# Patient Record
Sex: FEMALE | Race: WHITE | NOT HISPANIC OR LATINO | Employment: FULL TIME | URBAN - METROPOLITAN AREA
[De-identification: names, ages, dates, MRNs, and addresses within clinical notes are randomized per-mention and may not be internally consistent; named-entity substitution may affect disease eponyms.]

---

## 2017-02-14 DIAGNOSIS — F32.0 MILD SINGLE CURRENT EPISODE OF MAJOR DEPRESSIVE DISORDER (H): Primary | ICD-10-CM

## 2017-02-15 NOTE — TELEPHONE ENCOUNTER
Venlafaxine    Last Written Prescription Date: 11/18/16  Last Fill Quantity: 90, # refills: 0  Last Office Visit with [facility], [facility] or [facility] prescribing provider: 9/21/16        BP Readings from Last 3 Encounters:   09/21/16 107/69   05/06/16 101/64   04/22/15 105/67     Pulse: (for Fetzima)  Creatinine   Date Value Ref Range Status   04/22/2015 0.98 0.52 - 1.04 mg/dL Final   ]    Last PHQ-9 score on record=   PHQ-9 SCORE 11/18/2016   Total Score -   Total Score 2     Yusra Gonzales [facility]

## 2017-02-16 PROBLEM — F32.0 MILD SINGLE CURRENT EPISODE OF MAJOR DEPRESSIVE DISORDER (H): Status: ACTIVE | Noted: 2017-02-16

## 2017-02-16 RX ORDER — VENLAFAXINE HYDROCHLORIDE 75 MG/1
CAPSULE, EXTENDED RELEASE ORAL
Qty: 90 CAPSULE | Refills: 0 | Status: SHIPPED | OUTPATIENT
Start: 2017-02-16 | End: 2017-06-14

## 2017-02-16 NOTE — TELEPHONE ENCOUNTER
Routing refill request to provider for review/approval because:  Labs not current:  CrRadha Navarro RN  [facility]  [phone]

## 2017-05-12 ENCOUNTER — HOSPITAL ENCOUNTER (OUTPATIENT)
Dept: MAMMOGRAPHY | Facility: CLINIC | Age: 42
Discharge: HOME | End: 2017-05-12
Attending: NURSE PRACTITIONER | Admitting: NURSE PRACTITIONER
Payer: COMMERCIAL

## 2017-05-12 DIAGNOSIS — Z12.31 ENCOUNTER FOR SCREENING MAMMOGRAM FOR BREAST CANCER: ICD-10-CM

## 2017-05-12 PROCEDURE — G0202 SCR MAMMO BI INCL CAD: HCPCS

## 2017-06-14 DIAGNOSIS — F32.0 MILD SINGLE CURRENT EPISODE OF MAJOR DEPRESSIVE DISORDER (H): ICD-10-CM

## 2017-06-14 NOTE — LETTER
[facility]   [address] [place] [address]   [phone]      June 27, 2017    Nicole Lee Meester  [address]  [place] [address]            Dear Ms. Meester    Your physician requires a physical every 12 months in order to monitor your maintenance medication(s).  Your last physical was on 5/6/16.  We have faxed to the pharmacy a 3 month refill of your medication(s) until you can be seen.  Please call the clinic at [phone] to schedule an appointment..        Sincerely,    [facility]

## 2017-06-14 NOTE — TELEPHONE ENCOUNTER
Venlafaxine    Last Written Prescription Date: 2/16/17  Last Fill Quantity: 90, # refills: 0  Last Office Visit with [facility], [facility] or [facility] prescribing provider: 9/21/16        BP Readings from Last 3 Encounters:   09/21/16 107/69   05/06/16 101/64   04/22/15 105/67     Pulse: (for Fetzima)  Creatinine   Date Value Ref Range Status   04/22/2015 0.98 0.52 - 1.04 mg/dL Final   ]    Last PHQ-9 score on record=   PHQ-9 SCORE 11/18/2016   Total Score -   Total Score 2     Yusra Gonzales [facility]

## 2017-06-14 NOTE — LETTER
[facility]   [address] [place] [address]   [phone]      June 22, 2017    Nicole Lee Meester  [address] [address]            Dear Ms. Meester    Your physician requires an office visit every 12 months in order to monitor your maintenance medication(s).  Your last office visit was on 5/6/16.  We have faxed to the pharmacy a 3 month refill of your medication(s) until you can be seen by your provider.  Please call the clinic at [phone] to schedule an appointment..        Sincerely,    [facility]

## 2017-06-14 NOTE — TELEPHONE ENCOUNTER
Patient due for PHQ-9  Non-detailed message left to return our call.    Latasha Hansen RN   [employer]

## 2017-06-15 RX ORDER — VENLAFAXINE HYDROCHLORIDE 75 MG/1
CAPSULE, EXTENDED RELEASE ORAL
Qty: 90 CAPSULE | Refills: 0 | Status: SHIPPED | OUTPATIENT
Start: 2017-06-15 | End: 2020-07-05

## 2017-06-15 NOTE — TELEPHONE ENCOUNTER
PHQ-9 score:    PHQ-9 SCORE 6/15/2017   Total Score -   Total Score 2           Zita NavarroRN  [facility]  [phone]

## 2017-06-15 NOTE — TELEPHONE ENCOUNTER
Refill ordered. Patient needs to come in for annual exam and establishing her care.    Bailey Hernández MD  [facility], [place]

## 2017-06-16 ASSESSMENT — PATIENT HEALTH QUESTIONNAIRE - PHQ9: SUM OF ALL RESPONSES TO PHQ QUESTIONS 1-9: 2

## 2018-05-18 ENCOUNTER — HOSPITAL ENCOUNTER (OUTPATIENT)
Dept: MAMMOGRAPHY | Facility: CLINIC | Age: 43
Discharge: HOME | End: 2018-05-18
Attending: NURSE PRACTITIONER | Admitting: NURSE PRACTITIONER
Payer: COMMERCIAL

## 2018-05-18 DIAGNOSIS — Z12.39 BREAST CANCER SCREENING: ICD-10-CM

## 2018-05-18 PROCEDURE — 77067 SCR MAMMO BI INCL CAD: CPT

## 2018-06-24 ENCOUNTER — HEALTH MAINTENANCE LETTER (OUTPATIENT)
Age: 43
End: 2018-06-24

## 2018-11-01 ENCOUNTER — OFFICE VISIT (OUTPATIENT)
Dept: FAMILY MEDICINE | Facility: CLINIC | Age: 43
End: 2018-11-01
Payer: COMMERCIAL

## 2018-11-01 VITALS
HEIGHT: 67 IN | SYSTOLIC BLOOD PRESSURE: 117 MMHG | DIASTOLIC BLOOD PRESSURE: 74 MMHG | WEIGHT: 200 LBS | TEMPERATURE: 97.1 F | HEART RATE: 78 BPM | BODY MASS INDEX: 31.39 KG/M2

## 2018-11-01 DIAGNOSIS — Z00.00 ENCOUNTER FOR ROUTINE ADULT HEALTH EXAMINATION WITHOUT ABNORMAL FINDINGS: Primary | ICD-10-CM

## 2018-11-01 DIAGNOSIS — F32.0 MILD SINGLE CURRENT EPISODE OF MAJOR DEPRESSIVE DISORDER (H): ICD-10-CM

## 2018-11-01 DIAGNOSIS — Z23 NEED FOR PROPHYLACTIC VACCINATION WITH TETANUS-DIPHTHERIA (TD): ICD-10-CM

## 2018-11-01 DIAGNOSIS — R53.83 FATIGUE, UNSPECIFIED TYPE: ICD-10-CM

## 2018-11-01 DIAGNOSIS — Z13.1 SCREENING FOR DIABETES MELLITUS: ICD-10-CM

## 2018-11-01 DIAGNOSIS — Z13.220 SCREENING FOR LIPID DISORDERS: ICD-10-CM

## 2018-11-01 DIAGNOSIS — N94.6 DYSMENORRHEA: ICD-10-CM

## 2018-11-01 DIAGNOSIS — Z12.4 SCREENING FOR MALIGNANT NEOPLASM OF CERVIX: ICD-10-CM

## 2018-11-01 DIAGNOSIS — B35.4 TINEA CORPORIS: ICD-10-CM

## 2018-11-01 DIAGNOSIS — L29.9 PRURITIC DISORDER: ICD-10-CM

## 2018-11-01 DIAGNOSIS — N63.0 LUMP OR MASS IN BREAST: ICD-10-CM

## 2018-11-01 LAB
CHOLEST SERPL-MCNC: 151 MG/DL
GLUCOSE SERPL-MCNC: 93 MG/DL (ref 70–99)
HDLC SERPL-MCNC: 77 MG/DL
HGB BLD-MCNC: 12.8 G/DL (ref 11.7–15.7)
LDLC SERPL CALC-MCNC: 60 MG/DL
NONHDLC SERPL-MCNC: 74 MG/DL
TRIGL SERPL-MCNC: 71 MG/DL
TSH SERPL DL<=0.005 MIU/L-ACNC: 1.82 MU/L (ref 0.4–4)

## 2018-11-01 PROCEDURE — 90471 IMMUNIZATION ADMIN: CPT | Performed by: NURSE PRACTITIONER

## 2018-11-01 PROCEDURE — 80061 LIPID PANEL: CPT | Performed by: NURSE PRACTITIONER

## 2018-11-01 PROCEDURE — 99396 PREV VISIT EST AGE 40-64: CPT | Mod: 25 | Performed by: NURSE PRACTITIONER

## 2018-11-01 PROCEDURE — 87624 HPV HI-RISK TYP POOLED RSLT: CPT | Performed by: NURSE PRACTITIONER

## 2018-11-01 PROCEDURE — 90714 TD VACC NO PRESV 7 YRS+ IM: CPT | Performed by: NURSE PRACTITIONER

## 2018-11-01 PROCEDURE — 85018 HEMOGLOBIN: CPT | Performed by: NURSE PRACTITIONER

## 2018-11-01 PROCEDURE — 99214 OFFICE O/P EST MOD 30 MIN: CPT | Mod: 25 | Performed by: NURSE PRACTITIONER

## 2018-11-01 PROCEDURE — 84443 ASSAY THYROID STIM HORMONE: CPT | Performed by: NURSE PRACTITIONER

## 2018-11-01 PROCEDURE — 36415 COLL VENOUS BLD VENIPUNCTURE: CPT | Performed by: NURSE PRACTITIONER

## 2018-11-01 PROCEDURE — 82947 ASSAY GLUCOSE BLOOD QUANT: CPT | Performed by: NURSE PRACTITIONER

## 2018-11-01 PROCEDURE — G0145 SCR C/V CYTO,THINLAYER,RESCR: HCPCS | Performed by: NURSE PRACTITIONER

## 2018-11-01 RX ORDER — TRIAMCINOLONE ACETONIDE 1 MG/G
CREAM TOPICAL
Qty: 15 G | Refills: 0 | Status: SHIPPED | OUTPATIENT
Start: 2018-11-01 | End: 2019-08-01

## 2018-11-01 RX ORDER — KETOCONAZOLE 20 MG/G
CREAM TOPICAL DAILY
Qty: 15 G | Refills: 3 | Status: SHIPPED | OUTPATIENT
Start: 2018-11-01 | End: 2018-11-29

## 2018-11-01 NOTE — PROGRESS NOTES
SUBJECTIVE:   CC: Nicole Lee Meester is an 43 year old woman who presents for preventive health visit.     Healthy Habits:    Do you get at least three servings of calcium containing foods daily (dairy, green leafy vegetables, etc.)? yes    Amount of exercise or daily activities, outside of work: twice a week/ 40 min    Problems taking medications regularly No    Medication side effects: No    Have you had an eye exam in the past two years? yes    Do you see a dentist twice per year? yes    Do you have sleep apnea, excessive snoring or daytime drowsiness?no    HPI: Other issues:    Fatigue and sleepiness (despite 7 hours a night sleep). She denies change in routine, diet, or other factors to explain this. Denies snoring, sleep apnea, or insomnia. She does endorse cold intolerance and heavier than usual menses.     Weight gain (20 lb over 3 years). She endorses a slow, progressive weight gain despite no change to diet or activity level. She denies ankle swelling, shortness of breath while lying flat.    Heavy menses. Over the past 4-5 months, she has noted a greater than normal amount of bleeding associated with menses. Associated symptoms are unchanged in intensity or character. Duration of bleeding and menstrual cycle length are also unchanged.     Breast pain (left) with associated lump - both grandmothers (in their 60s). She had a screening mammogram in May which was read as normal with heterogeneous density. Recently, she has noted the development of a painful lump just below her nipple on her left breast. She denies discharge, redness, fever, chills, night sweats, and unintended weight loss.       Today's PHQ-2 Score:   PHQ-2 ( 1999 [employer]) 11/1/2018 9/21/2016   Q1: Little interest or pleasure in doing things 0 0   Q2: Feeling down, depressed or hopeless 0 0   PHQ-2 Score 0 0   Q1: Little interest or pleasure in doing things - -   Q2: Feeling down, depressed or hopeless - -   PHQ-2 Score - -     Abuse:  Current or Past(Physical, Sexual or Emotional)- No  Do you feel safe in your environment - Yes    Social History   Substance Use Topics     Smoking status: Never Smoker     Smokeless tobacco: Never Used     Alcohol use 0.0 oz/week     0 Standard drinks or equivalent per week      Comment: 1-2 drinks per week     If you drink alcohol do you typically have >3 drinks per day or >7 drinks per week? No                     Reviewed orders with patient.  Reviewed health maintenance and updated orders accordingly - Yes    Patient under age 50, mutual decision reflected in health maintenance.       Pertinent mammograms are reviewed under the imaging tab.  History of abnormal Pap smear: NO - age 30-65 PAP every 5 years with negative HPV co-testing recommended    PAP / HPV Latest Ref Rng & Units 4/22/2015 8/10/2011 10/11/2010   PAP - NIL NIL NIL   HPV 16 DNA NEG Negative - -   HPV 18 DNA NEG Negative - -   OTHER HR HPV NEG Negative - -     Reviewed and updated as needed this visit by clinical staff  Tobacco  Allergies  Meds  Med Hx  Surg Hx  Fam Hx  Soc Hx        Reviewed and updated as needed this visit by Provider  Tobacco  Med Hx  Surg Hx  Fam Hx  Soc Hx         ROS:  CONSTITUTIONAL: NEGATIVE for fever, chills. POSITIVE for slow, progressive weight gain, cold intolerance, and fatigue.  INTEGUMENTARU/SKIN: POSITIVE for itch (lower legs) and discrete pink lesions / rashes (lower legs)  EYES: NEGATIVE for vision changes or irritation  ENT: NEGATIVE for ear, mouth and throat problems  RESP: NEGATIVE for significant cough or SOB  BREAST: NEGATIVE for masses, tenderness or discharge  BREAST: POSITIVE for lump (left areola inferior to nipple) and pain (associated with lump)  CV: NEGATIVE for chest pain, palpitations or peripheral edema  GI: NEGATIVE for nausea, abdominal pain, heartburn, or change in bowel habits  : NEGATIVE for unusual urinary or vaginal symptoms. POSITIVE for heavy menses (though of normal frequency  "and duration)  MUSCULOSKELETAL: NEGATIVE for significant arthralgias or myalgia  NEURO: NEGATIVE for weakness, dizziness or paresthesias  PSYCHIATRIC: NEGATIVE for changes in mood or affect    OBJECTIVE:   /74 (BP Location: Right arm, Patient Position: Chair, Cuff Size: Adult Regular)  Pulse 78  Temp 97.1  F (36.2  C) (Tympanic)  Ht 5' 6.93\" (1.7 m)  Wt 200 lb (90.7 kg)  LMP 10/19/2018 (Exact Date)  Breastfeeding? No  BMI 31.39 kg/m2  EXAM:  GENERAL: healthy, alert and no distress  EYES: Eyes grossly normal to inspection, PERRL and conjunctivae and sclerae normal  HENT: ear canals and TM's normal, nose and mouth without ulcers or lesions  NECK: no adenopathy, no asymmetry, masses, or scars and thyroid normal to palpation  RESP: lungs clear to auscultation - no rales, rhonchi or wheezes  BREAST: 2 cm x 1 cm rubbery, mobile lump noted just inferior to left nipple. Tenderness of this region noted. No nipple discharge; no palpable axillary masses or adenopathy  CV: regular rate and rhythm, normal S1 S2, no S3 or S4, no murmur, click or rub, no peripheral edema and peripheral pulses strong  ABDOMEN: soft, nontender, no hepatosplenomegaly, no masses and bowel sounds normal   (female): normal female external genitalia, normal urethral meatus, vaginal mucosa pink, moist, well rugated, and normal cervix/adnexa/uterus without masses or discharge  MS: no gross musculoskeletal defects noted, no edema  SKIN: Left shin: discrete 1 cm x 1 cm pink annular lesion with scale (pruritic). Right shin: discrete 1 cm x 1 cm pink annular lesion with scale (pruritic).  NEURO: Normal strength and tone, mentation intact and speech normal  PSYCH: mentation appears normal, affect normal/bright    Diagnostic Test Results:  Results for orders placed or performed in visit on 11/01/18 (from the past 24 hour(s))   Hemoglobin   Result Value Ref Range    Hemoglobin 12.8 11.7 - 15.7 g/dL       ASSESSMENT/PLAN:   Beth was seen today for " physical and various other issues. Otherwise healthy 43 year old with new-onset fatigue, weight gain, pruritic rash, menstrual bleeding irregularity, and breast mass with pain. PAP with HPV co-test done today, as well as screening labs for lipid disorders and diabetes. Td booster given.     Diagnoses and all orders for this visit:    Encounter for routine adult health examination without abnormal findings    Mild single current episode of major depressive disorder (H)  Comment: DAP done today. Stable. No issues noted.     Screening for malignant neoplasm of cervix  -     Pap imaged thin layer screen with HPV - recommended age 30 - 65 years (select HPV order below)  -     HPV High Risk Types DNA Cervical  -     No suspicious structural anomalies noted on exam.    Need for prophylactic vaccination with tetanus-diphtheria (Td)  -     TD (ADULT, 7+) PRESERVE FREE  -          ADMIN VACCINE, FIRST    Screening for diabetes mellitus  -     Glucose    Screening for lipid disorders  -     Lipid panel reflex to direct LDL Fasting    Fatigue, unspecified type  -     TSH with free T4 reflex  -     Hemoglobin  -     If thyroid and hemoglobin are ok, will pursue other explanations (normal aging phenomenon, malignancy, sleep disorder, depression, etc.)    Lump or mass in breast  -     MA Diagnostic Digital Left; Future  -     US Breast Left Complete 4 Quadrants; Future  -     Will follow closely. Clean mammo in May but breast tissue characterized as heterogeneously dense, so possible that this is a normal benign variant.    Dysmenorrhea  -     Hemoglobin  -      exam unremarkable. Frequency and duration of menses unchanged. Volume of blood is only variable aspect. Will check hemoglobin and explore this further if it continues to occur. No red flags (pain, masses, etc.) at this point to warrant urgent OB-GYN referral or to prompt imaging.     Tinea corporis  -     ketoconazole (NIZORAL) 2 % cream; Apply topically daily   -      "Will treat topically for two weeks, after which she agrees to call if resolution not apparent.     Pruritic disorder  -     triamcinolone (KENALOG) 0.1 % cream; Apply sparingly to affected area three times daily for 14 days.  -      Has stated that this has helped her itch in the past. She does not have known atopic dermatitis, but there is associated itch with dermatophyte lesions noted today.     Other orders  -     DEPRESSION ACTION PLAN (DAP)    COUNSELING:   Reviewed preventive health counseling, as reflected in patient instructions    BP Readings from Last 1 Encounters:   11/01/18 117/74     Estimated body mass index is 31.39 kg/(m^2) as calculated from the following:    Height as of this encounter: 5' 6.93\" (1.7 m).    Weight as of this encounter: 200 lb (90.7 kg).      Weight management plan: Discussed healthy diet and exercise guidelines and patient will follow up in 12 months in clinic to re-evaluate.     reports that she has never smoked. She has never used smokeless tobacco.      Counseling Resources:  ATP IV Guidelines  Pooled Cohorts Equation Calculator  Breast Cancer Risk Calculator  FRAX Risk Assessment  ICSI Preventive Guidelines  Dietary Guidelines for Americans, 2010  USDA's MyPlate  ASA Prophylaxis  Lung CA Screening    Mariano Ayala NP  [facility] JAMIA PRAVANIA  "

## 2018-11-01 NOTE — MR AVS SNAPSHOT
After Visit Summary   11/1/2018    Nicole Lee Meester    MRN: [id number]           Patient Information     Date Of Birth          1975        Visit Information        Provider Department      11/1/2018 8:20 AM Mariano Ayala NP [facility]        Today's Diagnoses     Encounter for routine adult health examination without abnormal findings    -  1    Intractable migraine without aura and with status migrainosus        Mild single current episode of major depressive disorder (H)        Screening for malignant neoplasm of cervix        Need for prophylactic vaccination with tetanus-diphtheria (Td)        Screening for diabetes mellitus        Screening for lipid disorders        Fatigue, unspecified type        Lump or mass in breast        Dysmenorrhea        Tinea corporis        Pruritic disorder          Care Instructions      Preventive Health Recommendations  Female Ages 40 to 49    Yearly exam:     See your health care provider every year in order to  1. Review health changes.   2. Discuss preventive care.    3. Review your medicines if your doctor prescribed any.      Get a Pap test every three years (unless you have an abnormal result and your provider advises testing more often).      If you get Pap tests with HPV test, you only need to test every 5 years, unless you have an abnormal result. You do not need a Pap test if your uterus was removed (hysterectomy) and you have not had cancer.      You should be tested each year for STDs (sexually transmitted diseases), if you're at risk.     Ask your doctor if you should have a mammogram.      Have a colonoscopy (test for colon cancer) if someone in your family has had colon cancer or polyps before age 50.       Have a cholesterol test every 5 years.       Have a diabetes test (fasting glucose) after age 45. If you are at risk for diabetes, you should have this test every 3 years.    Shots: Get a flu shot each year. Get a  tetanus shot every 10 years.     Nutrition:     Eat at least 5 servings of fruits and vegetables each day.    Eat whole-grain bread, whole-wheat pasta and brown rice instead of white grains and rice.    Get adequate Calcium and Vitamin D.      Lifestyle    Exercise at least 150 minutes a week (an average of 30 minutes a day, 5 days a week). This will help you control your weight and prevent disease.    Limit alcohol to one drink per day.    No smoking.     Wear sunscreen to prevent skin cancer.    See your dentist every six months for an exam and cleaning.      1. We will be in touch after labs result and mammo / ultrasound is read.    2. Follow up if no relief of skin itch and lesions.                Follow-ups after your visit        Follow-up notes from your care team     Return in about 2 weeks (around 11/15/2018).      Your next 10 appointments already scheduled     Nov 02, 2018 10:30 AM CDT   MA DIAGNOSTIC DIGITAL LEFT with SHBCMA3   [facility] ([facility])    [address] [place] [address], [address] [place] [address] [id number] [phone]           How do I prepare for my exam? (Food and drink instructions) No Food and Drink Restrictions.  How do I prepare for my exam? (Other instructions) Do not use any powder, lotion or deodorant under your arms or on your breast. If you do, we will ask you to remove it before your exam.  What should I wear: Wear comfortable, two-piece clothing.  How long does the exam take: Most scans will take 15 minutes.  What should I bring: Bring any previous mammograms from other facilities or have them mailed to the breast center.  Do I need a :  No  is needed.  What do I need to tell my doctor: If you have any allergies, tell your care team.  What should I do after the exam: No restrictions, You may resume normal activities.  What is this test: This test is an x-ray of the breast to look for breast disease. The breast is pressed between two  "plates to flatten and spread the tissue. An X-ray is taken of the breast from different angles.  Who should I call with questions: If you have any questions, please call the Imaging Department where you will have your exam. Directions, parking instructions, and other information is available on our website, [email or web address].  Other information about my exam Three-dimensional (3D) mammograms are available at [facility] locations in [facility], [place], [place], [facility], [place], and [place].  Health locations include [place] and [facility] in [place].  Benefits of 3D mammograms include: * Improved rate of cancer detection * Decreases your chance of having to go back for more tests, which means fewer: * \"False-positive\" results (This means that there is an abnormal area but it isn't cancer.) * Invasive testing procedures, such as a biopsy or surgery * Can provide clearer images of the breast if you have dense breast tissue.  *3D mammography is an optional exam that anyone can have with a 2D mammogram. It doesn't replace or take the place of a 2D mammogram. 2D mammograms remain an effective screening test for all women.  Not all insurance companies cover the cost of a 3D mammogram. Check with your insurance.            Nov 02, 2018 11:00 AM CDT   US BREAST LEFT CMPL 4 QUAD with SHBCUS2   [facility] ([facility])    [address] [place] [address], [address] [place] [address] [id number] [phone]           How do I prepare for my exam? (Food and drink instructions) No Food and Drink Restrictions.  How do I prepare for my exam? (Other instructions) You do not need to do anything special to prepare for your exam.  What should I wear: Wear comfortable clothes.  How long does the exam take: Most ultrasounds take 30 to 60 minutes.  What should I bring: Bring a list of your medicines, including vitamins, minerals and over-the-counter drugs. It is " safest to leave personal items at home.  Do I need a :  No  is needed.  What do I need to tell my doctor: Tell your doctor about any allergies you may have.  What should I do after the exam: No restrictions, You may resume normal activities.  What is this test: An ultrasound uses sound waves to make pictures of the body. Sound waves do not cause pain. The only discomfort may be the pressure of the wand against your skin or full bladder.  Who should I call with questions: If you have any questions, please call the Imaging Department where you will have your exam. Directions, parking instructions, and other information is available on our website, [email or web address].              Future tests that were ordered for you today     Open Future Orders        Priority Expected Expires Ordered    MA Diagnostic Digital Left Routine  11/1/2019 11/1/2018    US Breast Left Complete 4 Quadrants Routine  11/1/2019 11/1/2018            Who to contact     If you have questions or need follow up information about today's clinic visit or your schedule please contact [facility] directly at [phone].  Normal or non-critical lab and imaging results will be communicated to you by Paytellerhart, letter or phone within 4 business days after the clinic has received the results. If you do not hear from us within 7 days, please contact the clinic through Paytellerhart or phone. If you have a critical or abnormal lab result, we will notify you by phone as soon as possible.  Submit refill requests through [employer] or call your pharmacy and they will forward the refill request to us. Please allow 3 business days for your refill to be completed.          Additional Information About Your Visit        Paytellerhart Information     [employer] gives you secure access to your electronic health record. If you see a primary care provider, you can also send messages to your care team and make appointments. If you have questions, please  "call your primary care clinic.  If you do not have a primary care provider, please call [phone] and they will assist you.        Care EveryWhere ID     This is your Care EveryWhere ID. This could be used by other organizations to access your [facility] medical records  [id number]        Your Vitals Were     Pulse Temperature Height Last Period Breastfeeding? BMI (Body Mass Index)    78 97.1  F (36.2  C) (Tympanic) 5' 6.93\" (1.7 m) 10/19/2018 (Exact Date) No 31.39 kg/m2       Blood Pressure from Last 3 Encounters:   11/01/18 117/74   09/21/16 107/69   05/06/16 101/64    Weight from Last 3 Encounters:   11/01/18 200 lb (90.7 kg)   09/21/16 187 lb (84.8 kg)   05/06/16 187 lb (84.8 kg)              We Performed the Following          ADMIN VACCINE, FIRST     DEPRESSION ACTION PLAN (DAP)     Glucose     Hemoglobin     HPV High Risk Types DNA Cervical     Lipid panel reflex to direct LDL Fasting     Pap imaged thin layer screen with HPV - recommended age 30 - 65 years (select HPV order below)     TD (ADULT, 7+) PRESERVE FREE     TSH with free T4 reflex          Today's Medication Changes          These changes are accurate as of 11/1/18  9:30 AM.  If you have any questions, ask your nurse or doctor.               Start taking these medicines.        Dose/Directions    ketoconazole 2 % cream   Commonly known as:  NIZORAL   Used for:  Tinea corporis   Started by:  Mariano Ayala NP        Apply topically daily   Quantity:  15 g   Refills:  3       triamcinolone 0.1 % cream   Commonly known as:  KENALOG   Used for:  Pruritic disorder   Started by:  Mariano Ayala NP        Apply sparingly to affected area three times daily for 14 days.   Quantity:  15 g   Refills:  0         Stop taking these medicines if you haven't already. Please contact your care team if you have questions.     hydrOXYzine 25 MG tablet   Commonly known as:  ATARAX   Stopped by:  Mariano Ayala NP                Where to get your " medicines      These medications were sent to [facility] [place] - [address]  [address], [place] [address]     Phone:  [phone]     ketoconazole 2 % cream    triamcinolone 0.1 % cream                Primary Care Provider Office Phone # Fax #    [facility] [phone]       [address] [place], [place] [address] [address]        Equal Access to Services     LEANNE STILL : Hadii aad ku hadasho Soomaali, waaxda luqadaha, qaybta kaalmada adeegyada, waxay idiin hayaan adeeg kharash lawarren . So [facility] [phone].    ATENCIÓN: Si habla español, tiene a hawkins disposición servicios gratuitos de asistencia lingüística. Llame al [phone].    We comply with applicable federal civil rights laws and [place] laws. We do not discriminate on the basis of race, color, national origin, age, disability, sex, sexual orientation, or gender identity.            Thank you!     Thank you for choosing [facility]  for your care. Our goal is always to provide you with excellent care. Hearing back from our patients is one way we can continue to improve our services. Please take a few minutes to complete the written survey that you may receive in the mail after your visit with us. Thank you!             Your Updated Medication List - Protect others around you: Learn how to safely use, store and throw away your medicines at www.disposemymeds.org.          This list is accurate as of 11/1/18  9:30 AM.  Always use your most recent med list.                   Brand Name Dispense Instructions for use Diagnosis    ketoconazole 2 % cream    NIZORAL    15 g    Apply topically daily    Tinea corporis       ketorolac 30 MG/ML injection    TORADOL     Inject 2 mLs into the muscle as needed.        MAXALT 10 MG tablet   Generic drug:  rizatriptan     18 tablet    Take 1 tablet (10 mg) by mouth at onset of headache for migraine May  repeat dose in 2 hours.  Do not exceed 30 mg in 24 hours    Migraine headache       Multi-vitamin Tabs tablet      Take 1 tablet by mouth daily        topiramate 25 MG capsule    TOPAMAX    120 capsule    Take 4 capsules by mouth At Bedtime.    Migraine headache       triamcinolone 0.1 % cream    KENALOG    15 g    Apply sparingly to affected area three times daily for 14 days.    Pruritic disorder       venlafaxine 75 MG 24 hr capsule    EFFEXOR-XR    90 capsule    TAKE ONE CAPSULE BY MOUTH EVERY DAY    Mild single current episode of major depressive disorder (H)

## 2018-11-02 ENCOUNTER — HOSPITAL ENCOUNTER (OUTPATIENT)
Dept: MAMMOGRAPHY | Facility: CLINIC | Age: 43
Discharge: HOME | End: 2018-11-02
Attending: NURSE PRACTITIONER | Admitting: NURSE PRACTITIONER
Payer: COMMERCIAL

## 2018-11-02 ENCOUNTER — HOSPITAL ENCOUNTER (OUTPATIENT)
Dept: MAMMOGRAPHY | Facility: CLINIC | Age: 43
End: 2018-11-02
Attending: NURSE PRACTITIONER
Payer: COMMERCIAL

## 2018-11-02 DIAGNOSIS — N63.0 LUMP OR MASS IN BREAST: ICD-10-CM

## 2018-11-02 PROCEDURE — 88305 TISSUE EXAM BY PATHOLOGIST: CPT | Mod: 26 | Performed by: NURSE PRACTITIONER

## 2018-11-02 PROCEDURE — 88305 TISSUE EXAM BY PATHOLOGIST: CPT | Performed by: NURSE PRACTITIONER

## 2018-11-02 PROCEDURE — 25000125 ZZHC RX 250: Performed by: RADIOLOGY

## 2018-11-02 PROCEDURE — 19083 BX BREAST 1ST LESION US IMAG: CPT | Mod: LT

## 2018-11-02 PROCEDURE — G0279 TOMOSYNTHESIS, MAMMO: HCPCS

## 2018-11-02 PROCEDURE — 76642 ULTRASOUND BREAST LIMITED: CPT | Mod: LT

## 2018-11-02 PROCEDURE — 40000986 MA POST PROCEDURE LEFT

## 2018-11-02 RX ADMIN — LIDOCAINE HYDROCHLORIDE 5 ML: 10 INJECTION, SOLUTION INFILTRATION; PERINEURAL at 11:26

## 2018-11-02 NOTE — DISCHARGE INSTRUCTIONS

## 2018-11-05 ENCOUNTER — TELEPHONE (OUTPATIENT)
Dept: FAMILY MEDICINE | Facility: CLINIC | Age: 43
End: 2018-11-05

## 2018-11-05 DIAGNOSIS — N61.1 BREAST ABSCESS: Primary | ICD-10-CM

## 2018-11-05 LAB
COPATH REPORT: NORMAL
COPATH REPORT: NORMAL
PAP: NORMAL

## 2018-11-05 RX ORDER — DICLOXACILLIN SODIUM 500 MG
500 CAPSULE ORAL 4 TIMES DAILY
Qty: 40 CAPSULE | Refills: 0 | Status: SHIPPED | OUTPATIENT
Start: 2018-11-05 | End: 2018-11-15

## 2018-11-05 NOTE — TELEPHONE ENCOUNTER
Please call patient and let her know that the pathology report from her biopsy is final. The lump does not appear to be malignant (cancer). However, it looks like it is actually an abscess (pocket of infection). The typical way to treat these is drainage (usually with a needle or excision) and antibiotics. A surgeon who specializes in breasts usually does this. I have referred you to the Breast Center in [place] (part of [facility]).  I will list the contact information below. Please make an appointment with them ASAP, so you can get this treated soon. I will write a prescription for dicloxacillin (an antibiotic used for this type of infection) in the meantime. It is taken 4 times a day (for 10 days). The prescription will be available for pickup at the clinic pharmacy here in . Thank you very much. Hope you're feeling better.    Your provider has referred you to: [place]: [facility] [phone]   [email or web address]

## 2018-11-06 ENCOUNTER — TELEPHONE (OUTPATIENT)
Dept: MAMMOGRAPHY | Facility: CLINIC | Age: 43
End: 2018-11-06

## 2018-11-06 LAB
FINAL DIAGNOSIS: NORMAL
HPV HR 12 DNA CVX QL NAA+PROBE: NEGATIVE
HPV16 DNA SPEC QL NAA+PROBE: NEGATIVE
HPV18 DNA SPEC QL NAA+PROBE: NEGATIVE
SPECIMEN DESCRIPTION: NORMAL
SPECIMEN SOURCE CVX/VAG CYTO: NORMAL

## 2018-11-06 NOTE — TELEPHONE ENCOUNTER
The patient indicates understanding of these issues and agrees with the plan.  Sent mychart with info.  Tatiana Perez RN  [place] Triage

## 2018-11-06 NOTE — TELEPHONE ENCOUNTER
Left non-detailed message to call the clinic back at [phone] and ask to speak with a  triage nurse.   Radha Rojas RN

## 2018-11-06 NOTE — TELEPHONE ENCOUNTER
"Patient called me today at 0904, and left a voicemail message asking if she could get scheduled for an appointment to take care of her left breast abscess.    Patient had a left breast ultrasound guided biopsy on 11/2/2018 at the [facility], and the final pathology results below.   Patient's primary care provider- Rodri Thurman, CNP with [facility] called patient to inform of results and recommended she have the breast abscess drained due to her symptoms.   I spoke with Ordri Jamie to clarify if he wanted her seen at the [facility] for abscess drainage, or if he wanted her to have a breast surgeon consultation.  Rodri Thurman informed me that he would like patient to be seen by a surgeon for consultation.      Cheo is now scheduled to meet with Dr. Tam Lester at the [facility] office on 11/8/2018 at 10:45a.m.  Patient was given contact information and directions by [facility] surgical consultant .  I will route a note to PCP to inform of this appointment date and time.  Aaliyah Thomson, RN, BSN      [facility] Name: MEESTER, NICOLE LEE   MR#: [id number]   Specimen #: [id number]   Collected: 11/2/2018   Received: 11/2/2018   Reported: 11/5/2018 16:53   Ordering Phy(s): RODRI THURMAN     For improved result formatting, select 'View Enhanced Report Format' under    Linked Documents section.     SPECIMEN(S):   Left ultrasound guided breast needle biopsy, 7:00 retroareolar, 1.3cm from    nipple     FINAL DIAGNOSIS:   Breast, left, 7:00, retroareolar, ultrasound biopsy-Acute and chronic   inflammatory change, no evidence of   malignancy     Electronically signed out by:     Brown Cruz M.D.     GROSS:   The specimen is received in formalin, labeled with the patient's name and   date of birth, and designated \"left   breast ultrasound core biopsy, 7:00, retroareolar\". It consists of three   yellow-tan fibrofatty breast cores,   ranging " from 1.1-1.2 cm in length and averaging 0.1 cm in diameter.  The   specimen is wrapped and entirely   submitted in one cassette. (Dictated by: ALCON Pradhan([facility])   11/2/2018 12:51 PM)     MICROSCOPIC:   Sections of breast show marked acute and chronic inflammatory change with   focal abscess formation.  No   malignant features are identified. If a clinically suspicious lesion is   present, rebiopsy is indicated.

## 2018-11-06 NOTE — PROGRESS NOTES
Pathology report reviewed with our breast radiologist- Dr. Milad Page. I phoned and informed patient of results showing benign Acute and chronic inflammatory change, no evidence of malignancy. Recommended follow up is Annual Screening Mammogram.  I also advised patient to continue to do monthly self breast exams and if she were to develop any breast changes or concerns, she should be seen by her PCP for consult.  Patient states no problems with biopsy site.    Patient's PCP is requesting we assist Beth in getting scheduled to see one of our breast surgeons for this left breast abscess formation.  Patient is now scheduled to meet with Dr. Tam Lester on 11/8/2018 @ 10:45a.m. At the [facility] [place].  Patient has been given the phone number and directions to this appointment.  Questions were answered and my phone number given if she has further questions or concerns.  Aaliyah Thomson, RN, BSN

## 2018-11-07 ENCOUNTER — OFFICE VISIT (OUTPATIENT)
Dept: FAMILY MEDICINE | Facility: CLINIC | Age: 43
End: 2018-11-07
Payer: COMMERCIAL

## 2018-11-07 ENCOUNTER — MYC MEDICAL ADVICE (OUTPATIENT)
Dept: FAMILY MEDICINE | Facility: CLINIC | Age: 43
End: 2018-11-07

## 2018-11-07 VITALS
BODY MASS INDEX: 31.49 KG/M2 | TEMPERATURE: 97.8 F | WEIGHT: 200.6 LBS | HEART RATE: 83 BPM | DIASTOLIC BLOOD PRESSURE: 76 MMHG | SYSTOLIC BLOOD PRESSURE: 116 MMHG

## 2018-11-07 DIAGNOSIS — T78.3XXA ANGIOEDEMA, INITIAL ENCOUNTER: ICD-10-CM

## 2018-11-07 DIAGNOSIS — L50.9 HIVES: Primary | ICD-10-CM

## 2018-11-07 PROCEDURE — 99214 OFFICE O/P EST MOD 30 MIN: CPT | Performed by: NURSE PRACTITIONER

## 2018-11-07 RX ORDER — PREDNISONE 20 MG/1
TABLET ORAL
Qty: 20 TABLET | Refills: 0 | Status: SHIPPED | OUTPATIENT
Start: 2018-11-07 | End: 2018-11-29

## 2018-11-07 RX ORDER — EPINEPHRINE 0.3 MG/.3ML
0.3 INJECTION SUBCUTANEOUS PRN
Qty: 0.6 ML | Refills: 0 | Status: SHIPPED | OUTPATIENT
Start: 2018-11-07 | End: 2020-01-23

## 2018-11-07 ASSESSMENT — ANXIETY QUESTIONNAIRES
7. FEELING AFRAID AS IF SOMETHING AWFUL MIGHT HAPPEN: NOT AT ALL
1. FEELING NERVOUS, ANXIOUS, OR ON EDGE: NOT AT ALL
6. BECOMING EASILY ANNOYED OR IRRITABLE: SEVERAL DAYS
IF YOU CHECKED OFF ANY PROBLEMS ON THIS QUESTIONNAIRE, HOW DIFFICULT HAVE THESE PROBLEMS MADE IT FOR YOU TO DO YOUR WORK, TAKE CARE OF THINGS AT HOME, OR GET ALONG WITH OTHER PEOPLE: NOT DIFFICULT AT ALL
5. BEING SO RESTLESS THAT IT IS HARD TO SIT STILL: NOT AT ALL
GAD7 TOTAL SCORE: 1
3. WORRYING TOO MUCH ABOUT DIFFERENT THINGS: NOT AT ALL
2. NOT BEING ABLE TO STOP OR CONTROL WORRYING: NOT AT ALL

## 2018-11-07 ASSESSMENT — PATIENT HEALTH QUESTIONNAIRE - PHQ9
SUM OF ALL RESPONSES TO PHQ QUESTIONS 1-9: 2
5. POOR APPETITE OR OVEREATING: NOT AT ALL

## 2018-11-07 NOTE — PROGRESS NOTES
SUBJECTIVE:                                                      Nicole Lee Meester is a 43 year old female who presents to clinic today for the following health issues:    Concern - Red bumps increasing in number  Onset: Oct 29, 2018    Description:   Red bumps on arms, thighs, and neck.  They are itchy and feel hot    Intensity: One under arm is painful     Progression of Symptoms:  worsening    Accompanying Signs & Symptoms:  None    Previous history of similar problem:   None    Precipitating factors:   Worsened by: Nothing    Alleviating factors:  Improved by: Nothing    Therapies Tried and outcome: triamcinolone and topiramate    HPI: Was seen last week for breast lump and rash on shins. Breast lump was worked up (mammography, ultrasound, and biopsy) and found to be an abscess. She has an appointment tomorrow with a surgeon to make a plan for aspiration versus excision. She was started on dicloxacillin yesterday for this issue. On Saturday, the rash on her shins (which was felt to be tinea corporis based on presentation) expanded to her wrists and forearms. Since then, they have extended further to her upper arms, thighs, and neck. These hives are incredibly itchy, according to her. She denies SOB, wheezing, throat closing, lip / tongue swelling. She also denies nausea, vomiting, and diarrhea. She participated in a virtual visit on Sunday. It was recommended that she take 10 mg daily of Zyrtec and 50 mg of Benadryl at night. These interventions have not helped substantially. Dicloxacillin is only new medication (and this was only started yesterday - 5 days after the onset of this issue). She was given a local injection of lidocaine for her breast lump biopsy, but she feels as though she has gotten this in the past without incident. No other new medications or exposures that could explain her symptoms.       Problem list and histories reviewed & adjusted, as indicated.  Additional history: as  documented    Reviewed and updated as needed this visit by clinical staff  Tobacco  Allergies  Meds  Problems  Med Hx  Surg Hx  Fam Hx  Soc Hx        Reviewed and updated as needed this visit by Provider  Allergies  Meds  Problems         ROS:  Constitutional, HEENT, cardiovascular, pulmonary, GI, musculoskeletal, skin systems are negative, except as otherwise noted.    OBJECTIVE:                                                      /76 (BP Location: Right arm, Patient Position: Chair, Cuff Size: Adult Large)  Pulse 83  Temp 97.8  F (36.6  C) (Tympanic)  Wt 200 lb 9.6 oz (91 kg)  LMP 10/19/2018 (Exact Date)  BMI 31.49 kg/m2 Body mass index is 31.49 kg/(m^2).   GENERAL: healthy, alert, well nourished, well hydrated, no distress  HENT: ear canals- normal; TMs- normal; Nose- normal; Mouth- no ulcers, no lesions  NECK: no tenderness, no adenopathy, no asymmetry, no masses, no stiffness; thyroid- normal to palpation  RESP: lungs clear to auscultation - no rales, no rhonchi, no wheezes. No stridor or apparent airway swelling.   CV: regular rates and rhythm, normal S1 S2, no S3 or S4 and no murmur, no click or rub -  ABDOMEN: soft, no tenderness, no  hepatosplenomegaly, no masses, normal bowel sounds  SKIN: scattered wheals most densely-concentrated over forearms, upper thighs, and upper arms. None on face or back. (see images below). Iron over inner aspect of left upper arm (warm, red, subcutaneous swelling) consistent with angioedema    Diagnostic test results:  No results found for this or any previous visit (from the past 24 hour(s)).                ASSESSMENT/PLAN:                                                      Beth was seen today for derm / immune problem. Etiology of her angioedema and urticaria unclear. No obvious triggers. Penicillin was started well after this began, so this can be safely ruled out. Possibly lidocaine used during biopsy or possibly related to an immune response  related to her breast infection or to the release of previously-sequestered bacteria from breast abscess during biopsy. Could also represent a primary immune disorder or hormonal disorder. Will treat symptoms now and hope it clears spontaneously in the coming days. Will order epi-pen and instruct on its use. Will also order a 12 day taper of prednisone (60-40-20-10) to add to her Benadryl and Zyrtec regimen. She agrees to call 911 with any indication of impending airway closure or compromise. She will follow up in the coming days to report on condition.     Diagnoses and all orders for this visit:    Hives  -     predniSONE (DELTASONE) 20 MG tablet; Take 3 tabs (60 mg) by mouth daily x 3 days, 2 tabs (40 mg) daily x 3 days, 1 tab (20 mg) daily x 3 days, then 1/2 tab (10 mg) x 3 days.  -     EPINEPHrine (EPIPEN/ADRENACLICK/OR ANY BX GENERIC EQUIV) 0.3 MG/0.3ML injection 2-pack; Inject 0.3 mLs (0.3 mg) into the muscle as needed for anaphylaxis    Angioedema, initial encounter      Risks, benefits and alternatives of treatments discussed. Plan agreed upon and all questions answered.      Follow-Up: Return in about 1 week (around 11/14/2018), or if symptoms worsen or fail to improve.    See Patient Instructions      CHRIST Trujillo, CNP

## 2018-11-07 NOTE — MR AVS SNAPSHOT
After Visit Summary   11/7/2018    Nicole Lee Meester    MRN: [id number]           Patient Information     Date Of Birth          1975        Visit Information        Provider Department      11/7/2018 1:00 PM Mariano Ayala NP [facility]        Today's Diagnoses     Hives    -  1    Angioedema, initial encounter          Care Instructions    1. Go to ED if throat, tongue, or lips swell.  2. First give epi-pen as instructed  3. Take prednisone for 12 days.  4. Can take 20 mg Zyrtec twice a day and then 50 mg Benadryl at night.   5. Call if this isn't working  6. I'll be following your breast center consult          Follow-ups after your visit        Follow-up notes from your care team     Return in about 1 week (around 11/14/2018), or if symptoms worsen or fail to improve.      Your next 10 appointments already scheduled     Nov 08, 2018 10:45 AM CST   CONSULT with Tam Lester MD   [facility] ([facility])    [address], [address]  [facility] [address] [phone]              Who to contact     If you have questions or need follow up information about today's clinic visit or your schedule please contact [facility] directly at [phone].  Normal or non-critical lab and imaging results will be communicated to you by Akitahart, letter or phone within 4 business days after the clinic has received the results. If you do not hear from us within 7 days, please contact the clinic through MyChart or phone. If you have a critical or abnormal lab result, we will notify you by phone as soon as possible.  Submit refill requests through Skyonic or call your pharmacy and they will forward the refill request to us. Please allow 3 business days for your refill to be completed.          Additional Information About Your Visit        Akitahart Information     Skyonic gives you secure access to your electronic health record.  If you see a primary care provider, you can also send messages to your care team and make appointments. If you have questions, please call your primary care clinic.  If you do not have a primary care provider, please call [phone] and they will assist you.        Care EveryWhere ID     This is your Care EveryWhere ID. This could be used by other organizations to access your [facility] medical records  [id number]        Your Vitals Were     Pulse Temperature Last Period BMI (Body Mass Index)          83 97.8  F (36.6  C) (Tympanic) 10/19/2018 (Exact Date) 31.49 kg/m2         Blood Pressure from Last 3 Encounters:   11/07/18 116/76   11/01/18 117/74   09/21/16 107/69    Weight from Last 3 Encounters:   11/07/18 200 lb 9.6 oz (91 kg)   11/01/18 200 lb (90.7 kg)   09/21/16 187 lb (84.8 kg)              Today, you had the following     No orders found for display         Today's Medication Changes          These changes are accurate as of 11/7/18  1:29 PM.  If you have any questions, ask your nurse or doctor.               Start taking these medicines.        Dose/Directions    EPINEPHrine 0.3 MG/0.3ML injection 2-pack   Commonly known as:  EPIPEN/ADRENACLICK/or ANY BX GENERIC EQUIV   Used for:  Hives   Started by:  Mariano Ayala NP        Dose:  0.3 mg   Inject 0.3 mLs (0.3 mg) into the muscle as needed for anaphylaxis   Quantity:  0.6 mL   Refills:  0       predniSONE 20 MG tablet   Commonly known as:  DELTASONE   Used for:  Hives   Started by:  Mariano Ayala NP        Take 3 tabs (60 mg) by mouth daily x 3 days, 2 tabs (40 mg) daily x 3 days, 1 tab (20 mg) daily x 3 days, then 1/2 tab (10 mg) x 3 days.   Quantity:  20 tablet   Refills:  0            Where to get your medicines      These medications were sent to [facility] - [place], [place] - [address]  [address] [place] [address]     Phone:  [phone]     EPINEPHrine 0.3 MG/0.3ML injection  2-pack    predniSONE 20 MG tablet                Primary Care Provider Office Phone # Fax #    [facility] [phone]        [facility] [address]        Equal Access to Services     LEANNE STILL : Hadii aad ku hadvasuo Soomaali, waaxda luqadaha, qaybta kaalmada adeegyada, waxlynnette idiin hayvamsin adefarideh lentz laAdriananicholas cheng. So [facility] [phone].    ATENCIÓN: Si habla español, tiene a hawkins disposición servicios gratuitos de asistencia lingüística. Llame al [phone].    We comply with applicable federal civil rights laws and [place] laws. We do not discriminate on the basis of race, color, national origin, age, disability, sex, sexual orientation, or gender identity.            Thank you!     Thank you for choosing [facility]  for your care. Our goal is always to provide you with excellent care. Hearing back from our patients is one way we can continue to improve our services. Please take a few minutes to complete the written survey that you may receive in the mail after your visit with us. Thank you!             Your Updated Medication List - Protect others around you: Learn how to safely use, store and throw away your medicines at www.disposemymeds.org.          This list is accurate as of 11/7/18  1:29 PM.  Always use your most recent med list.                   Brand Name Dispense Instructions for use Diagnosis    dicloxacillin 500 MG capsule    DYNAPEN    40 capsule    Take 1 capsule (500 mg) by mouth 4 times daily for 10 days    Breast abscess       EPINEPHrine 0.3 MG/0.3ML injection 2-pack    EPIPEN/ADRENACLICK/or ANY BX GENERIC EQUIV    0.6 mL    Inject 0.3 mLs (0.3 mg) into the muscle as needed for anaphylaxis    Hives       ketoconazole 2 % cream    NIZORAL    15 g    Apply topically daily    Tinea corporis       ketorolac 30 MG/ML injection    TORADOL     Inject 2 mLs into the muscle as needed.        MAXALT 10 MG tablet   Generic drug:   rizatriptan     18 tablet    Take 1 tablet (10 mg) by mouth at onset of headache for migraine May repeat dose in 2 hours.  Do not exceed 30 mg in 24 hours    Migraine headache       Multi-vitamin Tabs tablet      Take 1 tablet by mouth daily        predniSONE 20 MG tablet    DELTASONE    20 tablet    Take 3 tabs (60 mg) by mouth daily x 3 days, 2 tabs (40 mg) daily x 3 days, 1 tab (20 mg) daily x 3 days, then 1/2 tab (10 mg) x 3 days.    Hives       topiramate 25 MG capsule    TOPAMAX    120 capsule    Take 4 capsules by mouth At Bedtime.    Migraine headache       triamcinolone 0.1 % cream    KENALOG    15 g    Apply sparingly to affected area three times daily for 14 days.    Pruritic disorder       venlafaxine 75 MG 24 hr capsule    EFFEXOR-XR    90 capsule    TAKE ONE CAPSULE BY MOUTH EVERY DAY    Mild single current episode of major depressive disorder (H)

## 2018-11-07 NOTE — TELEPHONE ENCOUNTER
Nicole Lee Meester is a 43 year old female who calls with increasing red bumps. States that it has been going on the last week. See patient's My Chart message. Patient states that she started dicloxacillin yesterday, but her symptoms were worsening before she started the antibiotic.    NURSING ASSESSMENT:  Description:  Red hive like bumps that are increasing in number. States that spots are itchy despite taking allergy medications. States that she has 1 under her arm that is actually painful.  Onset/duration:  1 week   Precip. factors:  unknown  Associated symptoms:  Denies chest pain, SOB, facial swelling, itching in mouth or throat, N/V, cough, hoarseness. No bumps or swelling of face  Improves/worsens symptoms:  nothing  Pain scale (0-10)   0/10  LMP/preg/breast feeding:  Not assessed  Last exam/Treatment:  11/1/18  Allergies:   Allergies   Allergen Reactions     Nkda [No Known Drug Allergies]        MEDICATIONS:   Taking medication(s) as prescribed? Yes  Taking over the counter medication(s?) Yes  Any medication side effects? None reported      NURSING PLAN: Nursing advice to patient seek emergency medical attention for allergy symptoms outlined above. Patient verbalizes understanding    RECOMMENDED DISPOSITION:  See in 4 hours  Will comply with recommendation: Yes  If further questions/concerns or if symptoms do not improve, worsen or new symptoms develop, call your PCP or [facility] as soon as possible.      Guideline used:  Telephone Triage Protocols for Nurses, Fifth Edition, Radha Rojas RN

## 2018-11-07 NOTE — PATIENT INSTRUCTIONS
1. Go to ED if throat, tongue, or lips swell.  2. First give epi-pen as instructed  3. Take prednisone for 12 days.  4. Can take 10 mg Zyrtec twice a day and then 50 mg Benadryl at night.   5. Call if this isn't working  6. I'll be following your breast center consult

## 2018-11-08 ENCOUNTER — OFFICE VISIT (OUTPATIENT)
Dept: SURGERY | Facility: CLINIC | Age: 43
End: 2018-11-08
Payer: COMMERCIAL

## 2018-11-08 VITALS
HEIGHT: 67 IN | HEART RATE: 92 BPM | DIASTOLIC BLOOD PRESSURE: 62 MMHG | WEIGHT: 200 LBS | SYSTOLIC BLOOD PRESSURE: 100 MMHG | BODY MASS INDEX: 31.39 KG/M2

## 2018-11-08 DIAGNOSIS — N63.20 LEFT BREAST MASS: Primary | ICD-10-CM

## 2018-11-08 PROCEDURE — 99203 OFFICE O/P NEW LOW 30 MIN: CPT | Performed by: SURGERY

## 2018-11-08 ASSESSMENT — ANXIETY QUESTIONNAIRES: GAD7 TOTAL SCORE: 1

## 2018-11-08 NOTE — LETTER
2018    RE: Nicole Meester, : 1975      [facility]  Surgery Consultation     HPI: Patient is a 43 year old female who is here for consultation requested by Mariano Ayala [phone] for Left breast mass. The lesion was discovered by palpation.  She underwent an ultrasound and mammogram which showed a 1.3 cm cystic lesion.  A biopsy showed chronic inflammation without malignancy.  She states the lesion has not gone away and is still persisting.  She has never had anything like this in the past. The patients last mammogram was in . She has never had an abnormality or biopsy in the past. Her menarche was at age 15. . She has had 1 pregnancies and has 1 living children. Her first pregnancy was at age 26. Her family history is significant for grandmother on paternal and maternal side with breast cancer. She denies all other complaints today. Patient denies fevers, chills, nausea, vomiting, SOB, chest pain, abdominal pain.     PMH:  Nicole Lee Meester  has a past medical history of Contraceptive management (); Migraine headache; Mild major depression (H); and Tubal ligation status.  PSH:  Nicole Lee Meester  has a past surgical history that includes  DELIVERY ONLY (); lasik (-); and HYSTEROS W PERMANENT FALLOPAIN IMPLANT (4-3-2012).  Social History:  Nicole Lee Meester  reports that she has never smoked. She has never used smokeless tobacco. She reports that she drinks alcohol. She reports that she does not use illicit drugs.  Family History:  Nicole Lee Meester family history includes Alcohol/Drug in her paternal grandfather; Breast Cancer in her maternal grandmother and paternal grandmother; Cancer (age of onset: 55) in her father; Lipids in her father, paternal grandmother, and sister; Myocardial Infarction (age of onset: 53) in her father; Respiratory in her paternal grandmother; Skin Cancer in her maternal grandmother; Thyroid Disease in her  "paternal grandmother.  Medications/Allergies: Home medications and allergies reviewed.     ROS:  The 10 point Review of Systems is negative other than noted in the HPI.     Physical Exam:  /62  Pulse 92  Ht 5' 7\" (1.702 m)  Wt 200 lb (90.7 kg)  LMP 10/19/2018 (Exact Date)  BMI 31.32 kg/m2  GENERAL: Generally appears well.  Psych: Alert and Oriented.  Normal affect  Eyes: Sclera clear  Respiratory:  Lungs clear to ausculation bilaterally with good air excursion  Cardiovascular:  Regular Rate and Rhythm with no murmurs gallops or rubs, normal peripheral pulses  Breast: A bilateral breast exam was performed in the sitting and supine position. The hands were placed at this side and above the head. Bilateral breasts were palpated in a circumferential clockwise fashion including the supraclavicular and axillary areas. Right breast-no masses palpated.  No axillary adenopathy.. Left breast-mass palpated at 6 o clock position measuring 1-2 cm. Some brown/bloody drainage expressed from lateral nipple. No other masses or axillary adenopathy.  GI: Abdomen Soft Non-Tender   Lymphatic/Hematologic/Immune:  No femoral or cervical lymphadenopathy.  Integumentary:  No rashes  Neurological: grossly intact      All new lab and imaging data was reviewed.      Impression and Plan:  Patient is a 43 year old female with left breast mass and nipple discharge     PLAN:   I have described the pathophysiology of breast masses and nipple drainage including further workup and management.  She has a 1.3 cm complex cystic lesion which was biopsied as a possible abscess.  The lesion has persisted and is still palpable.  On examination I was able to express some bloody and brown drainage from the lateral portion of her nipple as well.  This is concerning for either fibrocystic disease or an underlying papilloma.  We discussed management which could include excision versus early surveillance with repeat ultrasound to assure the area has " resolved.  The patient is interested in going forward with excision.  Since the lesion is palpable, I will plan to perform a lumpectomy with duct excision given the drainage.  If the lesion completely resolves, I will consider placing a radioactive seed for localization.  Patient would like to schedule this in the next few weeks.  I described the risk, complications, including, but not limited to bleeding, infection, nipple necrosis or numbness, and need for additional procedures if any of these complications occur.  Patient agreed.     Thank you very much for this consult.     Tam Lester M.D.  [facility]  [phone]

## 2018-11-08 NOTE — MR AVS SNAPSHOT
After Visit Summary   11/8/2018    Nicole Lee Meester    MRN: [id number]           Patient Information     Date Of Birth          1975        Visit Information        Provider Department      11/8/2018 10:45 AM Tam Lester MD [facility]      Care Instructions    Your surgery is scheduled for 12/5/18 10:30 am at [facility]          Follow-ups after your visit        Your next 10 appointments already scheduled     Dec 05, 2018   Procedure with Tam Lester MD   [facility] (--)    [address]  [facility] [address] [phone]              Who to contact     If you have questions or need follow up information about today's clinic visit or your schedule please contact [facility] directly at [phone].  Normal or non-critical lab and imaging results will be communicated to you by MyChart, letter or phone within 4 business days after the clinic has received the results. If you do not hear from us within 7 days, please contact the clinic through GleeMasterhart or phone. If you have a critical or abnormal lab result, we will notify you by phone as soon as possible.  Submit refill requests through Chideo or call your pharmacy and they will forward the refill request to us. Please allow 3 business days for your refill to be completed.          Additional Information About Your Visit        MyChart Information     Chideo gives you secure access to your electronic health record. If you see a primary care provider, you can also send messages to your care team and make appointments. If you have questions, please call your primary care clinic.  If you do not have a primary care provider, please call [phone] and they will assist you.        Care EveryWhere ID     This is your Care EveryWhere ID. This could be used by other organizations to access your  "[facility] medical records  [id number]        Your Vitals Were     Pulse Height Last Period BMI (Body Mass Index)          92 5' 7\" (1.702 m) 10/19/2018 (Exact Date) 31.32 kg/m2         Blood Pressure from Last 3 Encounters:   11/08/18 100/62   11/07/18 116/76   11/01/18 117/74    Weight from Last 3 Encounters:   11/08/18 200 lb (90.7 kg)   11/07/18 200 lb 9.6 oz (91 kg)   11/01/18 200 lb (90.7 kg)              Today, you had the following     No orders found for display       Primary Care Provider Office Phone # Fax #    Mariano MIRANDA Jamie, [place] [phone]       [address] [place] [facility] [place] [address]        Equal Access to Services     [facility]: Hadii jaron meléndez hadasho Sodannielle, waaxda luqadaha, qaybta kaalmada adeegyada, [place] . So [facility] [phone].    ATENCIÓN: Si habla español, tiene a hawkins disposición servicios gratuitos de asistencia lingüística. Meek al [phone].    We comply with applicable federal civil rights laws and [place] laws. We do not discriminate on the basis of race, color, national origin, age, disability, sex, sexual orientation, or gender identity.            Thank you!     Thank you for choosing [facility]  for your care. Our goal is always to provide you with excellent care. Hearing back from our patients is one way we can continue to improve our services. Please take a few minutes to complete the written survey that you may receive in the mail after your visit with us. Thank you!             Your Updated Medication List - Protect others around you: Learn how to safely use, store and throw away your medicines at www.disposemymeds.org.          This list is accurate as of 11/8/18 11:20 AM.  Always use your most recent med list.                   Brand Name Dispense Instructions for use Diagnosis    dicloxacillin 500 MG capsule    DYNAPEN    40 capsule    Take 1 capsule (500 mg) by mouth 4 times daily for 10 days    " Breast abscess       EPINEPHrine 0.3 MG/0.3ML injection 2-pack    EPIPEN/ADRENACLICK/or ANY BX GENERIC EQUIV    0.6 mL    Inject 0.3 mLs (0.3 mg) into the muscle as needed for anaphylaxis    Hives       ketoconazole 2 % cream    NIZORAL    15 g    Apply topically daily    Tinea corporis       ketorolac 30 MG/ML injection    TORADOL     Inject 2 mLs into the muscle as needed.        MAXALT 10 MG tablet   Generic drug:  rizatriptan     18 tablet    Take 1 tablet (10 mg) by mouth at onset of headache for migraine May repeat dose in 2 hours.  Do not exceed 30 mg in 24 hours    Migraine headache       Multi-vitamin Tabs tablet      Take 1 tablet by mouth daily        predniSONE 20 MG tablet    DELTASONE    20 tablet    Take 3 tabs (60 mg) by mouth daily x 3 days, 2 tabs (40 mg) daily x 3 days, 1 tab (20 mg) daily x 3 days, then 1/2 tab (10 mg) x 3 days.    Hives       topiramate 25 MG capsule    TOPAMAX    120 capsule    Take 4 capsules by mouth At Bedtime.    Migraine headache       triamcinolone 0.1 % cream    KENALOG    15 g    Apply sparingly to affected area three times daily for 14 days.    Pruritic disorder       venlafaxine 75 MG 24 hr capsule    EFFEXOR-XR    90 capsule    TAKE ONE CAPSULE BY MOUTH EVERY DAY    Mild single current episode of major depressive disorder (H)

## 2018-11-09 NOTE — PROGRESS NOTES
[facility]  Surgery Consultation    HPI: Patient is a 43 year old female who is here for consultation requested by Mariano Ayala [phone] for Left breast mass. The lesion was discovered by palpation.  She underwent an ultrasound and mammogram which showed a 1.3 cm cystic lesion.  A biopsy showed chronic inflammation without malignancy.  She states the lesion has not gone away and is still persisting.  She has never had anything like this in the past. The patients last mammogram was in . She has never had an abnormality or biopsy in the past. Her menarche was at age 15. . She has had 1 pregnancies and has 1 living children. Her first pregnancy was at age 26. Her family history is significant for grandmother on paternal and maternal side with breast cancer. She denies all other complaints today. Patient denies fevers, chills, nausea, vomiting, SOB, chest pain, abdominal pain.    PMH:  Nicole Lee Meester  has a past medical history of Contraceptive management (); Migraine headache; Mild major depression (H); and Tubal ligation status.  PSH:  Nicole Lee Meester  has a past surgical history that includes  DELIVERY ONLY (); lasik (); and HYSTEROS W PERMANENT FALLOPAIN IMPLANT (4-3-2012).  Social History:  Nicole Lee Meester  reports that she has never smoked. She has never used smokeless tobacco. She reports that she drinks alcohol. She reports that she does not use illicit drugs.  Family History:  Nicole Lee Meester family history includes Alcohol/Drug in her paternal grandfather; Breast Cancer in her maternal grandmother and paternal grandmother; Cancer (age of onset: 55) in her father; Lipids in her father, paternal grandmother, and sister; Myocardial Infarction (age of onset: 53) in her father; Respiratory in her paternal grandmother; Skin Cancer in her maternal grandmother; Thyroid Disease in her paternal grandmother.  Medications/Allergies: Home medications and  "allergies reviewed.    ROS:  The 10 point Review of Systems is negative other than noted in the HPI.    Physical Exam:  /62  Pulse 92  Ht 5' 7\" (1.702 m)  Wt 200 lb (90.7 kg)  LMP 10/19/2018 (Exact Date)  BMI 31.32 kg/m2  GENERAL: Generally appears well.  Psych: Alert and Oriented.  Normal affect  Eyes: Sclera clear  Respiratory:  Lungs clear to ausculation bilaterally with good air excursion  Cardiovascular:  Regular Rate and Rhythm with no murmurs gallops or rubs, normal peripheral pulses  Breast: A bilateral breast exam was performed in the sitting and supine position. The hands were placed at this side and above the head. Bilateral breasts were palpated in a circumferential clockwise fashion including the supraclavicular and axillary areas. Right breast-no masses palpated.  No axillary adenopathy.. Left breast-mass palpated at 6 o clock position measuring 1-2 cm. Some brown/bloody drainage expressed from lateral nipple. No other masses or axillary adenopathy.  GI: Abdomen Soft Non-Tender   Lymphatic/Hematologic/Immune:  No femoral or cervical lymphadenopathy.  Integumentary:  No rashes  Neurological: grossly intact     All new lab and imaging data was reviewed.     Impression and Plan:  Patient is a 43 year old female with left breast mass and nipple discharge    PLAN:   I have described the pathophysiology of breast masses and nipple drainage including further workup and management.  She has a 1.3 cm complex cystic lesion which was biopsied as a possible abscess.  The lesion has persisted and is still palpable.  On examination I was able to express some bloody and brown drainage from the lateral portion of her nipple as well.  This is concerning for either fibrocystic disease or an underlying papilloma.  We discussed management which could include excision versus early surveillance with repeat ultrasound to assure the area has resolved.  The patient is interested in going forward with excision.  " Since the lesion is palpable, I will plan to perform a lumpectomy with duct excision given the drainage.  If the lesion completely resolves, I will consider placing a radioactive seed for localization.  Patient would like to schedule this in the next few weeks.  I described the risk, complications, including, but not limited to bleeding, infection, nipple necrosis or numbness, and need for additional procedures if any of these complications occur.  Patient agreed.    Thank you very much for this consult.    Tam Lester M.D.  [facility]  [phone]    Please route or send letter to:  Primary Care Provider (PCP) and Referring Provider

## 2018-11-16 ENCOUNTER — TELEPHONE (OUTPATIENT)
Dept: SURGERY | Facility: CLINIC | Age: 43
End: 2018-11-16

## 2018-11-16 NOTE — TELEPHONE ENCOUNTER
Type of surgery: excision of left breast mass  Location of surgery: [facility]  Date and time of surgery: 12/05/18 10:30 am  Surgeon: Dr Lester  Pre-Op Appt Date: pt to schedule  Post-Op Appt Date: pt to schedule   Packet sent out: Yes  Pre-cert/Authorization completed:  Not Applicable  Date: 12/05/18    General anesthesia

## 2018-11-29 ENCOUNTER — OFFICE VISIT (OUTPATIENT)
Dept: FAMILY MEDICINE | Facility: CLINIC | Age: 43
End: 2018-11-29
Payer: COMMERCIAL

## 2018-11-29 VITALS
TEMPERATURE: 98.1 F | DIASTOLIC BLOOD PRESSURE: 70 MMHG | WEIGHT: 203 LBS | SYSTOLIC BLOOD PRESSURE: 120 MMHG | HEART RATE: 87 BPM | BODY MASS INDEX: 31.79 KG/M2

## 2018-11-29 DIAGNOSIS — N61.1 BREAST ABSCESS: ICD-10-CM

## 2018-11-29 DIAGNOSIS — N63.20 LEFT BREAST MASS: Primary | ICD-10-CM

## 2018-11-29 DIAGNOSIS — Z01.818 PREOP GENERAL PHYSICAL EXAM: Primary | ICD-10-CM

## 2018-11-29 PROCEDURE — 99214 OFFICE O/P EST MOD 30 MIN: CPT | Performed by: NURSE PRACTITIONER

## 2018-11-29 NOTE — PROGRESS NOTES
[facility] [place]  [address] [address]  [phone]  Dept: [phone]    PRE-OP EVALUATION:  Today's date: 2018    Nicole Lee Meester (: 1975) presents for pre-operative evaluation assessment as requested by Dr. Lester   She requires evaluation and anesthesia risk assessment prior to undergoing surgery/procedure for treatment of breast mass .    Proposed Surgery/ Procedure: EXCISION OF LEFT BREAST MASS  Date of Surgery/ Procedure: 18  Time of Surgery/ Procedure: 10:35 am   Hospital/Surgical Facility: [facility]    Primary Physician: Mariano Ayala  Type of Anesthesia Anticipated: General    Patient has a Health Care Directive or Living Will:  NO    1. NO - Do you have a history of heart attack, stroke, stent, bypass or surgery on an artery in the head, neck, heart or legs?  2. NO - Do you ever have any pain or discomfort in your chest?  3. NO - Do you have a history of  Heart Failure?  4. NO - Are you troubled by shortness of breath when: walking on the level, up a slight hill or at night?  5. NO - Do you currently have a cold, bronchitis or other respiratory infection?  6. NO - Do you have a cough, shortness of breath or wheezing?  7. NO - Do you sometimes get pains in the calves of your legs when you walk?  8. YES - Do you or anyone in your family have previous history of blood clots? Father due to lung cancer   9. NO - Do you or does anyone in your family have a serious bleeding problem such as prolonged bleeding following surgeries or cuts?  10. NO - Have you ever had problems with anemia or been told to take iron pills?  11. NO - Have you had any abnormal blood loss such as black, tarry or bloody stools, or abnormal vaginal bleeding?  12. NO - Have you ever had a blood transfusion?  13. NO - Have you or any of your relatives ever had problems with anesthesia?  14. NO - Do you have sleep apnea, excessive snoring or daytime drowsiness?  15. NO -  Do you have any prosthetic heart valves?  16. NO - Do you have prosthetic joints?  17. NO - Is there any chance that you may be pregnant?      HPI:     HPI related to upcoming procedure: [facility] Elective excision scheduled for .    See problem list for active medical problems.  Problems all longstanding and stable, except as noted/documented.  See ROS for pertinent symptoms related to these conditions.                                                                                                                                                          .    MEDICAL HISTORY:     Patient Active Problem List    Diagnosis Date Noted     Mild single current episode of major depressive disorder (H) 2017     Priority: Medium     Intractable migraine without aura and with status migrainosus 2016     Priority: Medium     CARDIOVASCULAR SCREENING; LDL GOAL LESS THAN 160 2015     Priority: Medium      Past Medical History:   Diagnosis Date     Contraceptive management     BCP d/c      Migraine headache     sees neurologist     Mild major depression (H)      Tubal ligation status     [facility]     Past Surgical History:   Procedure Laterality Date     C  DELIVERY ONLY       HC HYSTEROS W PERMANENT FALLOPAIN IMPLANT  4-3-2012    [facility]     LASIK       Current Outpatient Prescriptions   Medication Sig Dispense Refill     EPINEPHrine (EPIPEN/ADRENACLICK/OR ANY BX GENERIC EQUIV) 0.3 MG/0.3ML injection 2-pack Inject 0.3 mLs (0.3 mg) into the muscle as needed for anaphylaxis 0.6 mL 0     hydrOXYzine (ATARAX) 25 MG tablet Take 1-2 tablets (25-50 mg) by mouth every 6 hours as needed for itching 60 tablet 1     ketorolac (TORADOL) 30 MG/ML injection Inject 2 mLs into the muscle as needed.       multivitamin, therapeutic with minerals (MULTI-VITAMIN) TABS Take 1 tablet by mouth daily       rizatriptan (MAXALT) 10 MG tablet Take 1 tablet (10  mg) by mouth at onset of headache for migraine May repeat dose in 2 hours.  Do not exceed 30 mg in 24 hours 18 tablet 3     topiramate (TOPAMAX) 25 MG capsule Take 4 capsules by mouth At Bedtime. 120 capsule 6     triamcinolone (KENALOG) 0.1 % cream Apply sparingly to affected area three times daily for 14 days. 15 g 0     venlafaxine (EFFEXOR-XR) 75 MG 24 hr capsule TAKE ONE CAPSULE BY MOUTH EVERY DAY 90 capsule 0     OTC products: None, except as noted above and periodic Benadryl    Allergies   Allergen Reactions     Nkda [No Known Drug Allergies]       Latex Allergy: NO    Social History   Substance Use Topics     Smoking status: Never Smoker     Smokeless tobacco: Never Used     Alcohol use 0.0 oz/week     0 Standard drinks or equivalent per week      Comment: 1-2 drinks per week     History   Drug Use No       REVIEW OF SYSTEMS:   CONSTITUTIONAL: NEGATIVE for fever, chills, change in weight  INTEGUMENTARY/SKIN: POSITIVE for 2 recent bouts with idiopathic urticaria. Two bursts of prednisone (last dose was 11/28). No airway involvement or lip/tongue swelling.  EYES: NEGATIVE for vision changes or irritation  ENT/MOUTH: NEGATIVE for ear, mouth and throat problems  RESP: NEGATIVE for significant cough or SOB  BREAST: POSITIVE for painful breast mass (to be excised)  CV: NEGATIVE for chest pain, palpitations or peripheral edema  GI: NEGATIVE for nausea, abdominal pain, heartburn, or change in bowel habits  : NEGATIVE for frequency, dysuria, or hematuria  MUSCULOSKELETAL: NEGATIVE for significant arthralgias or myalgia  NEURO: NEGATIVE for weakness, dizziness or paresthesias  ENDOCRINE: NEGATIVE for temperature intolerance, skin/hair changes  HEME: NEGATIVE for bleeding problems  PSYCHIATRIC: NEGATIVE for changes in mood or affect    EXAM:   /70 (BP Location: Left arm, Patient Position: Chair, Cuff Size: Adult Regular)  Pulse 87  Temp 98.1  F (36.7  C) (Tympanic)  Wt 203 lb (92.1 kg)  LMP 11/17/2018  BMI  31.79 kg/m2    GENERAL APPEARANCE: healthy, alert and no distress     EYES: EOMI, PERRL     HENT: ear canals and TM's normal and nose and mouth without ulcers or lesions     NECK: no adenopathy, no asymmetry, masses, or scars and thyroid normal to palpation     RESP: lungs clear to auscultation - no rales, rhonchi or wheezes     CV: regular rates and rhythm, normal S1 S2, no S3 or S4 and no murmur, click or rub     ABDOMEN:  soft, nontender, no HSM or masses and bowel sounds normal     MS: extremities normal- no gross deformities noted, no evidence of inflammation in joints, FROM in all extremities.     SKIN: 5-6 urticarial wheals scattered over forearms, neck, and upper legs - no tongue / lip swelling.      NEURO: Normal strength and tone, sensory exam grossly normal, mentation intact and speech normal     PSYCH: mentation appears normal. and affect normal/bright     LYMPHATICS: No cervical adenopathy    DIAGNOSTICS:   No labs or EKG required for low risk surgery (cataract, skin procedure, breast biopsy, etc)    Recent Labs   Lab Test  11/01/18   0921  09/21/16   1159  04/22/15   0756   07/15/14   0754  05/08/14   0744   HGB  12.8  13.6  14.2   < >   --   13.2   PLT   --   282  274   --    --   286   INR   --   0.97   --    --    --    --    NA   --    --   137   --    --   143   POTASSIUM   --    --   4.0   --    --   4.4   CR   --    --   0.98   --   1.09*  1.07*    < > = values in this interval not displayed.      IMPRESSION:   Reason for surgery/procedure: Painful breast mass.   Diagnosis/reason for consult: Pre-operative clearance    The proposed surgical procedure is considered LOW risk.    REVISED CARDIAC RISK INDEX  The patient has the following serious cardiovascular risks for perioperative complications such as (MI, PE, VFib and 3  AV Block):  No serious cardiac risks  INTERPRETATION: 0 risks: Class I (very low risk - 0.4% complication rate)    The patient has the following additional risks for  perioperative complications:    ATTENTION ANESTHESIA and/or SURGEON - Beth has had issues with idiopathic urticaria during the entire month of November. She was treated with two tapers of prednisone (last one finished 11/28). Was also taking scheduled hydroxyzine and Benadryl for this. No angioedema of face / lips / tongue or respiratory symptoms. Improving but not fully-resolved. See past OV notes and medication record for specifics. FYI in case you feel she needs stress dose hydrocortisone in the setting of recent prednisone use. Thanks.      ICD-10-CM    1. Preop general physical exam Z01.818    2. Breast abscess N61.1        RECOMMENDATIONS:     --Patient is to take all scheduled medications on the day of surgery EXCEPT for modifications listed below.    APPROVAL GIVEN to proceed with proposed procedure, without further diagnostic evaluation       Signed Electronically by: Mariano Ayala NP    Copy of this evaluation report is provided to requesting physician.    [facility] Preop Guidelines    Revised Cardiac Risk Index

## 2018-11-29 NOTE — MR AVS SNAPSHOT
After Visit Summary   11/29/2018    Nicole Lee Meester    MRN: [id number]           Patient Information     Date Of Birth          1975        Visit Information        Provider Department      11/29/2018 7:40 AM Mariano Ayala NP [facility] [place]        Today's Diagnoses     Preop general physical exam    -  1      Care Instructions      Before Your Surgery      Call your surgeon if there is any change in your health. This includes signs of a cold or flu (such as a sore throat, runny nose, cough, rash or fever).    Do not smoke, drink alcohol or take over the counter medicine (unless your surgeon or primary care doctor tells you to) for the 24 hours before and after surgery.    If you take prescribed drugs: Follow your doctor s orders about which medicines to take and which to stop until after surgery.    Eating and drinking prior to surgery: follow the instructions from your surgeon    Take a shower or bath the night before surgery. Use the soap your surgeon gave you to gently clean your skin. If you do not have soap from your surgeon, use your regular soap. Do not shave or scrub the surgery site.  Wear clean pajamas and have clean sheets on your bed.     Cleared for surgery. It is in my note, but please remind anesthesiologist about your two recent prednisone courses for hives.     Good luck! Let me know if I can do anything else.           Follow-ups after your visit        Follow-up notes from your care team     Return in about 1 year (around 11/29/2019) for Physical Exam.      Your next 10 appointments already scheduled     Dec 05, 2018   Procedure with Tam Lester MD   [facility] (--)    [address]  [facility] [address]   [phone]            Dec 05, 2018 10:15 AM [facility] Same Day Surgery with Tam Lester MD   [facility]uling ([facility])    [facility] ([facility])   [phone]              Who to contact     If you have questions or need follow up information about today's clinic visit or your schedule please contact [facility] [place] directly at [phone].  Normal or non-critical lab and imaging results will be communicated to you by MyChart, letter or phone within 4 business days after the clinic has received the results. If you do not hear from us within 7 days, please contact the clinic through [employer] or phone. If you have a critical or abnormal lab result, we will notify you by phone as soon as possible.  Submit refill requests through [employer] or call your pharmacy and they will forward the refill request to us. Please allow 3 business days for your refill to be completed.          Additional Information About Your Visit        [employer] Information     [employer] gives you secure access to your electronic health record. If you see a primary care provider, you can also send messages to your care team and make appointments. If you have questions, please call your primary care clinic.  If you do not have a primary care provider, please call [phone] and they will assist you.        Care EveryWhere ID     This is your Care EveryWhere ID. This could be used by other organizations to access your Early medical records  [id number]        Your Vitals Were     Pulse Temperature Last Period BMI (Body Mass Index)          87 98.1  F (36.7  C) (Tympanic) 11/17/2018 31.79 kg/m2         Blood Pressure from Last 3 Encounters:   11/29/18 120/70   11/08/18 100/62   11/07/18 116/76    Weight from Last 3 Encounters:   11/29/18 203 lb (92.1 kg)   11/08/18 200 lb (90.7 kg)   11/07/18 200 lb 9.6 oz (91 kg)              Today, you had the following     No orders found for display         Today's Medication Changes          These changes are accurate as of 11/29/18  8:07 AM.  If you have any questions, ask your nurse or  doctor.               Stop taking these medicines if you haven't already. Please contact your care team if you have questions.     ketoconazole 2 % external cream   Commonly known as:  NIZORAL   Stopped by:  Mariano Ayala NP           predniSONE 20 MG tablet   Commonly known as:  DELTASONE   Stopped by:  Mariano Ayala NP                    Primary Care Provider Office Phone # Fax #    Mariano Ayala [phone]       [address]  [place] [address]        Equal Access to Services     [facility]: Hadii aad ku hadasho Soomaali, waaxda luqadaha, qaybta kaalmada adeegyada, waxay idiin hayaan adeeg khararylan bradford . So [facility] [phone].    ATENCIÓN: Si habla español, tiene a hawkins disposición servicios gratuitos de asistencia lingüística. [facility] [phone].    We comply with applicable federal civil rights laws and [place] laws. We do not discriminate on the basis of race, color, national origin, age, disability, sex, sexual orientation, or gender identity.            Thank you!     Thank you for choosing [facility] [place]  for your care. Our goal is always to provide you with excellent care. Hearing back from our patients is one way we can continue to improve our services. Please take a few minutes to complete the written survey that you may receive in the mail after your visit with us. Thank you!             Your Updated Medication List - Protect others around you: Learn how to safely use, store and throw away your medicines at www.disposemymeds.org.          This list is accurate as of 11/29/18  8:07 AM.  Always use your most recent med list.                   Brand Name Dispense Instructions for use Diagnosis    EPINEPHrine 0.3 MG/0.3ML injection 2-pack    EPIPEN/ADRENACLICK/or ANY BX GENERIC EQUIV    0.6 mL    Inject 0.3 mLs (0.3 mg) into the muscle as needed for anaphylaxis    Hives       hydrOXYzine 25 MG tablet    ATARAX    60 tablet    Take 1-2  tablets (25-50 mg) by mouth every 6 hours as needed for itching    Hives       ketorolac 30 MG/ML injection    TORADOL     Inject 2 mLs into the muscle as needed.        MAXALT 10 MG tablet   Generic drug:  rizatriptan     18 tablet    Take 1 tablet (10 mg) by mouth at onset of headache for migraine May repeat dose in 2 hours.  Do not exceed 30 mg in 24 hours    Migraine headache       Multi-vitamin tablet      Take 1 tablet by mouth daily        topiramate 25 MG capsule    TOPAMAX    120 capsule    Take 4 capsules by mouth At Bedtime.    Migraine headache       triamcinolone 0.1 % external cream    KENALOG    15 g    Apply sparingly to affected area three times daily for 14 days.    Pruritic disorder       venlafaxine 75 MG 24 hr capsule    EFFEXOR-XR    90 capsule    TAKE ONE CAPSULE BY MOUTH EVERY DAY    Mild single current episode of major depressive disorder (H)

## 2018-11-29 NOTE — PATIENT INSTRUCTIONS
Before Your Surgery      Call your surgeon if there is any change in your health. This includes signs of a cold or flu (such as a sore throat, runny nose, cough, rash or fever).    Do not smoke, drink alcohol or take over the counter medicine (unless your surgeon or primary care doctor tells you to) for the 24 hours before and after surgery.    If you take prescribed drugs: Follow your doctor s orders about which medicines to take and which to stop until after surgery.    Eating and drinking prior to surgery: follow the instructions from your surgeon    Take a shower or bath the night before surgery. Use the soap your surgeon gave you to gently clean your skin. If you do not have soap from your surgeon, use your regular soap. Do not shave or scrub the surgery site.  Wear clean pajamas and have clean sheets on your bed.     Cleared for surgery. It is in my note, but please remind anesthesiologist about your two recent prednisone courses for hives.     Good luck! Let me know if I can do anything else.

## 2018-12-03 NOTE — H&P (VIEW-ONLY)
[facility]  [address] [facility] [address]  [phone]  Dept: [phone]    PRE-OP EVALUATION:  Today's date: 2018    Nicole Lee Meester (: 1975) presents for pre-operative evaluation assessment as requested by Dr. Lester   She requires evaluation and anesthesia risk assessment prior to undergoing surgery/procedure for treatment of breast mass .    Proposed Surgery/ Procedure: EXCISION OF LEFT BREAST MASS  Date of Surgery/ Procedure: 18  Time of Surgery/ Procedure: 10:35 am   Hospital/Surgical Facility: [facility]    Primary Physician: Mariano Ayala  Type of Anesthesia Anticipated: General    Patient has a Health Care Directive or Living Will:  NO    1. NO - Do you have a history of heart attack, stroke, stent, bypass or surgery on an artery in the head, neck, heart or legs?  2. NO - Do you ever have any pain or discomfort in your chest?  3. NO - Do you have a history of  Heart Failure?  4. NO - Are you troubled by shortness of breath when: walking on the level, up a slight hill or at night?  5. NO - Do you currently have a cold, bronchitis or other respiratory infection?  6. NO - Do you have a cough, shortness of breath or wheezing?  7. NO - Do you sometimes get pains in the calves of your legs when you walk?  8. YES - Do you or anyone in your family have previous history of blood clots? Father due to lung cancer   9. NO - Do you or does anyone in your family have a serious bleeding problem such as prolonged bleeding following surgeries or cuts?  10. NO - Have you ever had problems with anemia or been told to take iron pills?  11. NO - Have you had any abnormal blood loss such as black, tarry or bloody stools, or abnormal vaginal bleeding?  12. NO - Have you ever had a blood transfusion?  13. NO - Have you or any of your relatives ever had problems with anesthesia?  14. NO - Do you have sleep apnea, excessive snoring or daytime drowsiness?  15. NO -  Do you have any prosthetic heart valves?  16. NO - Do you have prosthetic joints?  17. NO - Is there any chance that you may be pregnant?      HPI:     HPI related to upcoming procedure: [facility]ful breast [facility]. Elective excision scheduled for .    See problem list for active medical problems.  Problems all longstanding and stable, except as noted/documented.  See ROS for pertinent symptoms related to these conditions.                                                                                                                                                          .    MEDICAL HISTORY:     Patient Active Problem List    Diagnosis Date Noted     Mild single current episode of major depressive disorder (H) 2017     Priority: Medium     Intractable migraine without aura and with status migrainosus 2016     Priority: Medium     CARDIOVASCULAR SCREENING; LDL GOAL LESS THAN 160 2015     Priority: Medium      Past Medical History:   Diagnosis Date     Contraceptive management     BCP d/c      Migraine headache     sees neurologist     Mild major depression (H)      Tubal ligation status     Essure     Past Surgical History:   Procedure Laterality Date     C  DELIVERY ONLY       HC HYSTEROS W PERMANENT FALLOPAIN IMPLANT  4-3-2012    Essure     LASIK       Current Outpatient Prescriptions   Medication Sig Dispense Refill     EPINEPHrine (EPIPEN/ADRENACLICK/OR ANY BX GENERIC EQUIV) 0.3 MG/0.3ML injection 2-pack Inject 0.3 mLs (0.3 mg) into the muscle as needed for anaphylaxis 0.6 mL 0     hydrOXYzine (ATARAX) 25 MG tablet Take 1-2 tablets (25-50 mg) by mouth every 6 hours as needed for itching 60 tablet 1     ketorolac (TORADOL) 30 MG/ML injection Inject 2 mLs into the muscle as needed.       multivitamin, therapeutic with minerals (MULTI-VITAMIN) TABS Take 1 tablet by mouth daily       rizatriptan (MAXALT) 10 MG tablet Take 1 tablet (10  mg) by mouth at onset of headache for migraine May repeat dose in 2 hours.  Do not exceed 30 mg in 24 hours 18 tablet 3     topiramate (TOPAMAX) 25 MG capsule Take 4 capsules by mouth At Bedtime. 120 capsule 6     triamcinolone (KENALOG) 0.1 % cream Apply sparingly to affected area three times daily for 14 days. 15 g 0     venlafaxine (EFFEXOR-XR) 75 MG 24 hr capsule TAKE ONE CAPSULE BY MOUTH EVERY DAY 90 capsule 0     OTC products: None, except as noted above and periodic Benadryl    Allergies   Allergen Reactions     Nkda [No Known Drug Allergies]       Latex Allergy: NO    Social History   Substance Use Topics     Smoking status: Never Smoker     Smokeless tobacco: Never Used     Alcohol use 0.0 oz/week     0 Standard drinks or equivalent per week      Comment: 1-2 drinks per week     History   Drug Use No       REVIEW OF SYSTEMS:   CONSTITUTIONAL: NEGATIVE for fever, chills, change in weight  INTEGUMENTARY/SKIN: POSITIVE for 2 recent bouts with idiopathic urticaria. Two bursts of prednisone (last dose was 11/28). No airway involvement or lip/tongue swelling.  EYES: NEGATIVE for vision changes or irritation  ENT/MOUTH: NEGATIVE for ear, mouth and throat problems  RESP: NEGATIVE for significant cough or SOB  BREAST: POSITIVE for painful breast mass (to be excised)  CV: NEGATIVE for chest pain, palpitations or peripheral edema  GI: NEGATIVE for nausea, abdominal pain, heartburn, or change in bowel habits  : NEGATIVE for frequency, dysuria, or hematuria  MUSCULOSKELETAL: NEGATIVE for significant arthralgias or myalgia  NEURO: NEGATIVE for weakness, dizziness or paresthesias  ENDOCRINE: NEGATIVE for temperature intolerance, skin/hair changes  HEME: NEGATIVE for bleeding problems  PSYCHIATRIC: NEGATIVE for changes in mood or affect    EXAM:   /70 (BP Location: Left arm, Patient Position: Chair, Cuff Size: Adult Regular)  Pulse 87  Temp 98.1  F (36.7  C) (Tympanic)  Wt 203 lb (92.1 kg)  LMP 11/17/2018  BMI  31.79 kg/m2    GENERAL APPEARANCE: healthy, alert and no distress     EYES: EOMI, PERRL     HENT: ear canals and TM's normal and nose and mouth without ulcers or lesions     NECK: no adenopathy, no asymmetry, masses, or scars and thyroid normal to palpation     RESP: lungs clear to auscultation - no rales, rhonchi or wheezes     CV: regular rates and rhythm, normal S1 S2, no S3 or S4 and no murmur, click or rub     ABDOMEN:  soft, nontender, no HSM or masses and bowel sounds normal     MS: extremities normal- no gross deformities noted, no evidence of inflammation in joints, FROM in all extremities.     SKIN: 5-6 urticarial wheals scattered over forearms, neck, and upper legs - no tongue / lip swelling.      NEURO: Normal strength and tone, sensory exam grossly normal, mentation intact and speech normal     PSYCH: mentation appears normal. and affect normal/bright     LYMPHATICS: No cervical adenopathy    DIAGNOSTICS:   No labs or EKG required for low risk surgery (cataract, skin procedure, breast biopsy, etc)    Recent Labs   Lab Test  11/01/18   0921  09/21/16   1159  04/22/15   0756   07/15/14   0754  05/08/14   0744   HGB  12.8  13.6  14.2   < >   --   13.2   PLT   --   282  274   --    --   286   INR   --   0.97   --    --    --    --    NA   --    --   137   --    --   143   POTASSIUM   --    --   4.0   --    --   4.4   CR   --    --   0.98   --   1.09*  1.07*    < > = values in this interval not displayed.      IMPRESSION:   Reason for surgery/procedure: Painful breast mass.   Diagnosis/reason for consult: Pre-operative clearance    The proposed surgical procedure is considered LOW risk.    REVISED CARDIAC RISK INDEX  The patient has the following serious cardiovascular risks for perioperative complications such as (MI, PE, VFib and 3  AV Block):  No serious cardiac risks  INTERPRETATION: 0 risks: Class I (very low risk - 0.4% complication rate)    The patient has the following additional risks for  perioperative complications:    ATTENTION ANESTHESIA and/or SURGEON - Beth has had issues with idiopathic urticaria during the entire month of November. She was treated with two tapers of prednisone (last one finished 11/28). Was also taking scheduled hydroxyzine and Benadryl for this. No angioedema of face / lips / tongue or respiratory symptoms. Improving but not fully-resolved. See past OV notes and medication record for specifics. FYI in case you feel she needs stress dose hydrocortisone in the setting of recent prednisone use. Thanks.      ICD-10-CM    1. Preop general physical exam Z01.818    2. Breast abscess N61.1        RECOMMENDATIONS:     --Patient is to take all scheduled medications on the day of surgery EXCEPT for modifications listed below.    APPROVAL GIVEN to proceed with proposed procedure, without further diagnostic evaluation       Signed Electronically by: Mariano Ayala NP    Copy of this evaluation report is provided to requesting physician.    [facility] Preop Guidelines    Revised Cardiac Risk Index

## 2018-12-04 ENCOUNTER — HOSPITAL ENCOUNTER (OUTPATIENT)
Dept: MAMMOGRAPHY | Facility: CLINIC | Age: 43
End: 2018-12-04
Attending: SURGERY
Payer: COMMERCIAL

## 2018-12-04 ENCOUNTER — HOSPITAL ENCOUNTER (OUTPATIENT)
Dept: MAMMOGRAPHY | Facility: CLINIC | Age: 43
Discharge: HOME | End: 2018-12-04
Attending: SURGERY | Admitting: SURGERY
Payer: COMMERCIAL

## 2018-12-04 DIAGNOSIS — N63.20 LEFT BREAST MASS: ICD-10-CM

## 2018-12-04 PROCEDURE — 25000125 ZZHC RX 250: Performed by: SURGERY

## 2018-12-04 PROCEDURE — A4641 RADIOPHARM DX AGENT NOC: HCPCS

## 2018-12-04 PROCEDURE — 40000986 MA POST PROCEDURE LEFT

## 2018-12-04 RX ADMIN — LIDOCAINE HYDROCHLORIDE 5 ML: 10 INJECTION, SOLUTION INFILTRATION; PERINEURAL at 10:57

## 2018-12-04 NOTE — PROGRESS NOTES
SBAR Seed Localization    SITUATION:  Patient to breast imaging center for imaging guided seed localizations before breast lumpectomy or excision biopsy without sentinel node injection.    BACKGROUND:  Breast imaging cancer, breast abnormality  Ordered procedure completed: Yes  Special needs identified: Yes     ASSESSMENT:  SBAR report called to patient care unit because of unexpected event in radiology: No  Allergies and medication list reviewed prior to procedure. Yes  Skin cleansed with ChloraPrep One-Step.  Anesthesia: approximately 5ml of 1% Lidocaine injection subcutaneous before seed insertion administered by the radiologist.   Gauze dressing over insertion site(s).  Post procedure mammogram completed: Yes    Patient tolerance: Patient tolerated procedure well.    RECOMMENDATIONS:  Patient returning to same day surgery 12/05/2018.  Copy of note given to patient and instructions to hand this note to surgery staff.    Please call [facility] [phone] if there are any questions.

## 2018-12-05 ENCOUNTER — HOSPITAL ENCOUNTER (OUTPATIENT)
Dept: MAMMOGRAPHY | Facility: CLINIC | Age: 43
End: 2018-12-05
Attending: SURGERY
Payer: COMMERCIAL

## 2018-12-05 ENCOUNTER — OFFICE VISIT (OUTPATIENT)
Dept: SURGERY | Facility: PHYSICIAN GROUP | Age: 43
End: 2018-12-05
Payer: COMMERCIAL

## 2018-12-05 ENCOUNTER — HOSPITAL ENCOUNTER (OUTPATIENT)
Facility: CLINIC | Age: 43
Discharge: HOME | End: 2018-12-05
Attending: SURGERY | Admitting: SURGERY
Payer: COMMERCIAL

## 2018-12-05 ENCOUNTER — ANESTHESIA (OUTPATIENT)
Dept: SURGERY | Facility: CLINIC | Age: 43
End: 2018-12-05
Payer: COMMERCIAL

## 2018-12-05 ENCOUNTER — ANESTHESIA EVENT (OUTPATIENT)
Dept: SURGERY | Facility: CLINIC | Age: 43
End: 2018-12-05
Payer: COMMERCIAL

## 2018-12-05 VITALS
DIASTOLIC BLOOD PRESSURE: 67 MMHG | WEIGHT: 204 LBS | RESPIRATION RATE: 12 BRPM | SYSTOLIC BLOOD PRESSURE: 138 MMHG | BODY MASS INDEX: 32.02 KG/M2 | TEMPERATURE: 97 F | OXYGEN SATURATION: 99 % | HEIGHT: 67 IN

## 2018-12-05 DIAGNOSIS — N63.20 LEFT BREAST MASS: Primary | ICD-10-CM

## 2018-12-05 DIAGNOSIS — N63.20 LEFT BREAST MASS: ICD-10-CM

## 2018-12-05 LAB — HCG UR QL: NEGATIVE

## 2018-12-05 PROCEDURE — 25000125 ZZHC RX 250: Performed by: NURSE ANESTHETIST, CERTIFIED REGISTERED

## 2018-12-05 PROCEDURE — 19125 EXCISION BREAST LESION: CPT | Performed by: SURGERY

## 2018-12-05 PROCEDURE — 25000132 ZZH RX MED GY IP 250 OP 250 PS 637: Performed by: PHYSICIAN ASSISTANT

## 2018-12-05 PROCEDURE — 36000050 ZZH SURGERY LEVEL 2 1ST 30 MIN: Performed by: SURGERY

## 2018-12-05 PROCEDURE — 37000009 ZZH ANESTHESIA TECHNICAL FEE, EACH ADDTL 15 MIN: Performed by: SURGERY

## 2018-12-05 PROCEDURE — 71000027 ZZH RECOVERY PHASE 2 EACH 15 MINS: Performed by: SURGERY

## 2018-12-05 PROCEDURE — 25000128 H RX IP 250 OP 636: Performed by: NURSE ANESTHETIST, CERTIFIED REGISTERED

## 2018-12-05 PROCEDURE — 37000008 ZZH ANESTHESIA TECHNICAL FEE, 1ST 30 MIN: Performed by: SURGERY

## 2018-12-05 PROCEDURE — 36000052 ZZH SURGERY LEVEL 2 EA 15 ADDTL MIN: Performed by: SURGERY

## 2018-12-05 PROCEDURE — 81025 URINE PREGNANCY TEST: CPT | Performed by: SURGERY

## 2018-12-05 PROCEDURE — 88307 TISSUE EXAM BY PATHOLOGIST: CPT | Performed by: SURGERY

## 2018-12-05 PROCEDURE — 71000012 ZZH RECOVERY PHASE 1 LEVEL 1 FIRST HR: Performed by: SURGERY

## 2018-12-05 PROCEDURE — 25000125 ZZHC RX 250: Performed by: SURGERY

## 2018-12-05 PROCEDURE — 27210794 ZZH OR GENERAL SUPPLY STERILE: Performed by: SURGERY

## 2018-12-05 PROCEDURE — 25000128 H RX IP 250 OP 636: Performed by: SURGERY

## 2018-12-05 PROCEDURE — 25000566 ZZH SEVOFLURANE, EA 15 MIN: Performed by: SURGERY

## 2018-12-05 PROCEDURE — 25000128 H RX IP 250 OP 636: Performed by: ANESTHESIOLOGY

## 2018-12-05 PROCEDURE — 40000170 ZZH STATISTIC PRE-PROCEDURE ASSESSMENT II: Performed by: SURGERY

## 2018-12-05 PROCEDURE — 40000268 MA BREAST SPECIMEN LEFT OR

## 2018-12-05 PROCEDURE — 71000013 ZZH RECOVERY PHASE 1 LEVEL 1 EA ADDTL HR: Performed by: SURGERY

## 2018-12-05 PROCEDURE — 88307 TISSUE EXAM BY PATHOLOGIST: CPT | Mod: 26 | Performed by: SURGERY

## 2018-12-05 RX ORDER — BUPIVACAINE HYDROCHLORIDE 2.5 MG/ML
INJECTION, SOLUTION EPIDURAL; INFILTRATION; INTRACAUDAL
Status: DISCONTINUED
Start: 2018-12-05 | End: 2018-12-05 | Stop reason: HOSPADM

## 2018-12-05 RX ORDER — NALOXONE HYDROCHLORIDE 0.4 MG/ML
.1-.4 INJECTION, SOLUTION INTRAMUSCULAR; INTRAVENOUS; SUBCUTANEOUS
Status: DISCONTINUED | OUTPATIENT
Start: 2018-12-05 | End: 2018-12-05 | Stop reason: HOSPADM

## 2018-12-05 RX ORDER — ONDANSETRON 4 MG/1
4 TABLET, ORALLY DISINTEGRATING ORAL EVERY 30 MIN PRN
Status: DISCONTINUED | OUTPATIENT
Start: 2018-12-05 | End: 2018-12-05 | Stop reason: HOSPADM

## 2018-12-05 RX ORDER — LIDOCAINE HYDROCHLORIDE 20 MG/ML
INJECTION, SOLUTION INFILTRATION; PERINEURAL PRN
Status: DISCONTINUED | OUTPATIENT
Start: 2018-12-05 | End: 2018-12-05

## 2018-12-05 RX ORDER — SODIUM CHLORIDE, SODIUM LACTATE, POTASSIUM CHLORIDE, CALCIUM CHLORIDE 600; 310; 30; 20 MG/100ML; MG/100ML; MG/100ML; MG/100ML
INJECTION, SOLUTION INTRAVENOUS CONTINUOUS
Status: DISCONTINUED | OUTPATIENT
Start: 2018-12-05 | End: 2018-12-05 | Stop reason: HOSPADM

## 2018-12-05 RX ORDER — DEXAMETHASONE SODIUM PHOSPHATE 4 MG/ML
INJECTION, SOLUTION INTRA-ARTICULAR; INTRALESIONAL; INTRAMUSCULAR; INTRAVENOUS; SOFT TISSUE PRN
Status: DISCONTINUED | OUTPATIENT
Start: 2018-12-05 | End: 2018-12-05

## 2018-12-05 RX ORDER — MEPERIDINE HYDROCHLORIDE 25 MG/ML
12.5 INJECTION INTRAMUSCULAR; INTRAVENOUS; SUBCUTANEOUS
Status: DISCONTINUED | OUTPATIENT
Start: 2018-12-05 | End: 2018-12-05 | Stop reason: HOSPADM

## 2018-12-05 RX ORDER — ONDANSETRON 2 MG/ML
4 INJECTION INTRAMUSCULAR; INTRAVENOUS EVERY 30 MIN PRN
Status: DISCONTINUED | OUTPATIENT
Start: 2018-12-05 | End: 2018-12-05 | Stop reason: HOSPADM

## 2018-12-05 RX ORDER — FENTANYL CITRATE 50 UG/ML
25-50 INJECTION, SOLUTION INTRAMUSCULAR; INTRAVENOUS
Status: DISCONTINUED | OUTPATIENT
Start: 2018-12-05 | End: 2018-12-05 | Stop reason: HOSPADM

## 2018-12-05 RX ORDER — EPINEPHRINE 1 MG/ML
INJECTION, SOLUTION INTRAMUSCULAR; SUBCUTANEOUS
Status: DISCONTINUED
Start: 2018-12-05 | End: 2018-12-05 | Stop reason: HOSPADM

## 2018-12-05 RX ORDER — LIDOCAINE HYDROCHLORIDE 10 MG/ML
INJECTION, SOLUTION EPIDURAL; INFILTRATION; INTRACAUDAL; PERINEURAL
Status: DISCONTINUED
Start: 2018-12-05 | End: 2018-12-05 | Stop reason: HOSPADM

## 2018-12-05 RX ORDER — MAGNESIUM HYDROXIDE 1200 MG/15ML
LIQUID ORAL PRN
Status: DISCONTINUED | OUTPATIENT
Start: 2018-12-05 | End: 2018-12-05 | Stop reason: HOSPADM

## 2018-12-05 RX ORDER — ONDANSETRON 2 MG/ML
INJECTION INTRAMUSCULAR; INTRAVENOUS PRN
Status: DISCONTINUED | OUTPATIENT
Start: 2018-12-05 | End: 2018-12-05

## 2018-12-05 RX ORDER — SODIUM CHLORIDE, SODIUM LACTATE, POTASSIUM CHLORIDE, CALCIUM CHLORIDE 600; 310; 30; 20 MG/100ML; MG/100ML; MG/100ML; MG/100ML
INJECTION, SOLUTION INTRAVENOUS CONTINUOUS PRN
Status: DISCONTINUED | OUTPATIENT
Start: 2018-12-05 | End: 2018-12-05

## 2018-12-05 RX ORDER — AMOXICILLIN 250 MG
1-2 CAPSULE ORAL 2 TIMES DAILY PRN
Qty: 30 TABLET | Refills: 0 | Status: SHIPPED | OUTPATIENT
Start: 2018-12-05 | End: 2019-08-01

## 2018-12-05 RX ORDER — CEFAZOLIN SODIUM 2 G/100ML
2 INJECTION, SOLUTION INTRAVENOUS
Status: COMPLETED | OUTPATIENT
Start: 2018-12-05 | End: 2018-12-05

## 2018-12-05 RX ORDER — PROPOFOL 10 MG/ML
INJECTION, EMULSION INTRAVENOUS CONTINUOUS PRN
Status: DISCONTINUED | OUTPATIENT
Start: 2018-12-05 | End: 2018-12-05

## 2018-12-05 RX ORDER — HYDROCODONE BITARTRATE AND ACETAMINOPHEN 5; 325 MG/1; MG/1
1-2 TABLET ORAL
Status: COMPLETED | OUTPATIENT
Start: 2018-12-05 | End: 2018-12-05

## 2018-12-05 RX ORDER — FENTANYL CITRATE 50 UG/ML
INJECTION, SOLUTION INTRAMUSCULAR; INTRAVENOUS PRN
Status: DISCONTINUED | OUTPATIENT
Start: 2018-12-05 | End: 2018-12-05

## 2018-12-05 RX ORDER — HYDROMORPHONE HYDROCHLORIDE 1 MG/ML
.3-.5 INJECTION, SOLUTION INTRAMUSCULAR; INTRAVENOUS; SUBCUTANEOUS EVERY 10 MIN PRN
Status: DISCONTINUED | OUTPATIENT
Start: 2018-12-05 | End: 2018-12-05 | Stop reason: HOSPADM

## 2018-12-05 RX ORDER — HYDROCODONE BITARTRATE AND ACETAMINOPHEN 5; 325 MG/1; MG/1
1-2 TABLET ORAL EVERY 4 HOURS PRN
Qty: 15 TABLET | Refills: 0 | Status: SHIPPED | OUTPATIENT
Start: 2018-12-05 | End: 2019-08-01

## 2018-12-05 RX ORDER — PROPOFOL 10 MG/ML
INJECTION, EMULSION INTRAVENOUS PRN
Status: DISCONTINUED | OUTPATIENT
Start: 2018-12-05 | End: 2018-12-05

## 2018-12-05 RX ORDER — CEFAZOLIN SODIUM 1 G/3ML
1 INJECTION, POWDER, FOR SOLUTION INTRAMUSCULAR; INTRAVENOUS SEE ADMIN INSTRUCTIONS
Status: DISCONTINUED | OUTPATIENT
Start: 2018-12-05 | End: 2018-12-05 | Stop reason: HOSPADM

## 2018-12-05 RX ADMIN — ONDANSETRON 4 MG: 2 INJECTION INTRAMUSCULAR; INTRAVENOUS at 11:28

## 2018-12-05 RX ADMIN — PROPOFOL 40 MG: 10 INJECTION, EMULSION INTRAVENOUS at 11:10

## 2018-12-05 RX ADMIN — FENTANYL CITRATE 50 MCG: 50 INJECTION, SOLUTION INTRAMUSCULAR; INTRAVENOUS at 10:52

## 2018-12-05 RX ADMIN — LIDOCAINE HYDROCHLORIDE 100 MG: 20 INJECTION, SOLUTION INFILTRATION; PERINEURAL at 10:52

## 2018-12-05 RX ADMIN — FENTANYL CITRATE 50 MCG: 50 INJECTION, SOLUTION INTRAMUSCULAR; INTRAVENOUS at 12:17

## 2018-12-05 RX ADMIN — MIDAZOLAM 2 MG: 1 INJECTION INTRAMUSCULAR; INTRAVENOUS at 10:51

## 2018-12-05 RX ADMIN — DEXMEDETOMIDINE HYDROCHLORIDE 4 MCG: 100 INJECTION, SOLUTION INTRAVENOUS at 11:01

## 2018-12-05 RX ADMIN — CEFAZOLIN SODIUM 2 G: 2 INJECTION, SOLUTION INTRAVENOUS at 10:53

## 2018-12-05 RX ADMIN — PROPOFOL 40 MG: 10 INJECTION, EMULSION INTRAVENOUS at 11:03

## 2018-12-05 RX ADMIN — DEXAMETHASONE SODIUM PHOSPHATE 4 MG: 4 INJECTION, SOLUTION INTRA-ARTICULAR; INTRALESIONAL; INTRAMUSCULAR; INTRAVENOUS; SOFT TISSUE at 11:26

## 2018-12-05 RX ADMIN — FENTANYL CITRATE 50 MCG: 50 INJECTION, SOLUTION INTRAMUSCULAR; INTRAVENOUS at 11:53

## 2018-12-05 RX ADMIN — DEXMEDETOMIDINE HYDROCHLORIDE 4 MCG: 100 INJECTION, SOLUTION INTRAVENOUS at 11:02

## 2018-12-05 RX ADMIN — FENTANYL CITRATE 50 MCG: 50 INJECTION, SOLUTION INTRAMUSCULAR; INTRAVENOUS at 11:11

## 2018-12-05 RX ADMIN — SODIUM CHLORIDE, POTASSIUM CHLORIDE, SODIUM LACTATE AND CALCIUM CHLORIDE: 600; 310; 30; 20 INJECTION, SOLUTION INTRAVENOUS at 10:50

## 2018-12-05 RX ADMIN — HYDROCODONE BITARTRATE AND ACETAMINOPHEN 1 TABLET: 5; 325 TABLET ORAL at 12:36

## 2018-12-05 RX ADMIN — PROPOFOL 200 MCG/KG/MIN: 10 INJECTION, EMULSION INTRAVENOUS at 10:52

## 2018-12-05 RX ADMIN — DEXMEDETOMIDINE HYDROCHLORIDE 4 MCG: 100 INJECTION, SOLUTION INTRAVENOUS at 11:00

## 2018-12-05 RX ADMIN — PROPOFOL 200 MG: 10 INJECTION, EMULSION INTRAVENOUS at 10:52

## 2018-12-05 NOTE — ANESTHESIA CARE TRANSFER NOTE
Patient: Nicole Lee Meester    Procedure(s):  SEED LOCALIZED LEFT BREAST MASS EXCISION    Diagnosis: LEFT BREAST MASS  Diagnosis Additional Information: No value filed.    Anesthesia Type:   General, LMA     Note:  Airway :Face Mask  Patient transferred to:PACU  Comments: 1144:  To par responsive, dentition unchanged from pre-op.Handoff Report: Identifed the Patient, Identified the Reponsible Provider, Reviewed the pertinent medical history, Discussed the surgical course, Reviewed Intra-OP anesthesia mangement and issues during anesthesia, Set expectations for post-procedure period and Allowed opportunity for questions and acknowledgement of understanding      Vitals: (Last set prior to Anesthesia Care Transfer)    CRNA VITALS  12/5/2018 1113 - 12/5/2018 1143      12/5/2018             Pulse: 91    SpO2: 100 %    Resp Rate (set): 10                Electronically Signed By: CHRIST Vences CRNA  December 5, 2018  11:43 AM

## 2018-12-05 NOTE — IP AVS SNAPSHOT
[facility]    [address]    [place] [address]    Phone:  [phone]    Fax:  [phone]                                       After Visit Summary   12/5/2018    Nicole Lee Meester    MRN: [id number]           After Visit Summary Signature Page     I have received my discharge instructions, and my questions have been answered. I have discussed any challenges I see with this plan with the nurse or doctor.    ..........................................................................................................................................  Patient/Patient Representative Signature      ..........................................................................................................................................  Patient Representative Print Name and Relationship to Patient    ..................................................               ................................................  Date                                   Time    ..........................................................................................................................................  Reviewed by Signature/Title    ...................................................              ..............................................  Date                                               Time          22EPIC Rev 08/18

## 2018-12-05 NOTE — OP NOTE
PREOPERATIVE DIAGNOSIS: Left breast mass and nipple discharge      POSTOPERATIVE DIAGNOSIS: Left breast mass and nipple discharge      PROCEDURE:   1. Left breast radioactive seed lumpectomy      ASSISTANT: Marie Subramanian PA-C      ANESTHESIA: LMA.       COMPLICATIONS: None.       BLOOD LOSS: 10 cc.       FINDINGS: The seed was found in the specimen       INDICATIONS: Mrs. Meester presented with a left breast mass and nipple discharge.  She has elected to go forward with radioactive seed lumpectomy. I explained the risks, benefits, complications including but not limited to bleeding, infection, possible postop hematoma, seroma, skin necrosis, lymphedema, axillary nerve injury. If any of these occurred, she would require additional procedures. Patient did agree and did sign consent.       DETAILS OF PROCEDURE:  A radioactive seed was placed next to the clip without difficult. The patient was brought to the operating room per anesthesia. She was then prepped and draped in the usual fashion using ChloraPrep. A surgical timeout was then performed, verifying the correct surgeon, site, procedure and patient, and all in the room were in agreement. The [place] counter was used to locate the seed in the 6:00 position. A skin incision was made in the lower nipple areolar complex. The incision was carried through the subcutaneous tissue with cautery. The [place] counter was used to help guide dissection with cautery. The lesion was circumferentially freed without bleeding. It was painted per pathology protocol. A mammogram confirmed the seed was in the middle of the specimen. The clip fell out during dissection. The underlying breast tissue was approximated with multiple 3-0 Vicryl sutures. There was no bleeding. It was irrigated until clear. The skin was approximated with multiple 3-0 Vicryls and a 4-0 Monocryl in subcuticular fashion. Dermabond was applied. The patient was awoken from anesthesia. She transferred to PACU in  stable condition.  A physician assistant was medically necessary for prepping, patient positioning, to aid in retraction, control of bleeding,and complex closure.           SHANNON FINE MD      Please CC to referring provider and PCP

## 2018-12-05 NOTE — DISCHARGE INSTRUCTIONS
Same Day Surgery Discharge Instructions for  Sedation and General Anesthesia       It's not unusual to feel dizzy, light-headed or faint for up to 24 hours after surgery or while taking pain medication.  If you have these symptoms: sit for a few minutes before standing and have someone assist you when you get up to walk or use the bathroom.      You should rest and relax for the next 24 hours. We recommend you make arrangements to have an adult stay with you for at least 24 hours after your discharge.  Avoid hazardous and strenuous activity.      DO NOT DRIVE any vehicle or operate mechanical equipment for 24 hours following the end of your surgery.  Even though you may feel normal, your reactions may be affected by the medication you have received.      Do not drink alcoholic beverages for 24 hours following surgery.       Slowly progress to your regular diet as you feel able. It's not unusual to feel nauseated and/or vomit after receiving anesthesia.  If you develop these symptoms, drink clear liquids (apple juice, ginger ale, broth, 7-up, etc. ) until you feel better.  If your nausea and vomiting persists for 24 hours, please notify your surgeon.        All narcotic pain medications, along with inactivity and anesthesia, can cause constipation. Drinking plenty of liquids and increasing fiber intake will help.      For any questions of a medical nature, call your surgeon.      Do not make important decisions for 24 hours.      If you had general anesthesia, you may have a sore throat for a couple of days related to the breathing tube used during surgery.  You may use Cepacol lozenges to help with this discomfort.  If it worsens or if you develop a fever, contact your surgeon.       If you feel your pain is not well managed with the pain medications prescribed by your surgeon, please contact your surgeon's office to let them know so they can address your concerns.         [facility]  Discharge Instructions: Post-Operative Breast Surgery    ACTIVITY    Take frequent short walks and increase your activity gradually.      Avoid strenuous physical activity or heavy lifting greater than 15-20 lbs. for 1-2 weeks with arm on the surgery side.  You may climb stairs.    Gentle rotation and stretching of your arms and shoulders will prevent joint stiffness.    You may drive without restrictions when you are not using any prescription pain medication and feel comfortable in a car.    You may return to work/school when you are comfortable without any prescription pain medication.    WOUND CARE    You may remove your outer dressing and shower 48 hours after the surgery.  Pat your incisions dry and leave them open to air.  Re-apply dressing (Band-Aids or gauze/tape) as needed for drainage.    You may have steri-strips (looks like white tape) on your incisions.  You may peel off the steri-strips 2 weeks after your surgery if they have not peeled off on their own.     Do not soak your incisions in a tub or pool for 2 weeks.     Do not apply any lotions, creams, or ointments to your incisions.    A ridge under your incisions is normal and will gradually resolve.    Wear a supportive bra for 1-2 weeks, day and night.    DIET    Start with liquids, then gradually resume your regular diet as tolerated.     Drink plenty of liquids to stay hydrated.    PAIN    Expect some tenderness and discomfort at the incision site(s).  Use the prescribed pain medication at your discretion.  Expect gradual resolution of your pain over several days.    You may take ibuprofen with food (unless you have been told not to) instead of or in addition to your prescribed pain medication.  If you are taking Norco or Percocet, do not take any additional acetaminophen/APAP/Tylenol.    Do not drink alcohol or drive while you are taking pain medications.    You may apply ice to your incisions in 20 minute intervals as needed for  the next 48 hours.      EXPECTATIONS    Pain medications can cause constipation.  Limit use when possible.  Take over the counter stool softener/stimulant, such as Colace or Senna, 1-2 times a day with plenty of water.  You may take a mild over the counter laxative, such as Miralax or a suppository, as needed.      You may discontinue these medications once you are having regular bowel movements and/or are no longer taking your narcotic pain medication.    Blue dye may have been used during your surgery to locate lymph nodes and can cause your urine to be blue/green for several days after surgery.  This is not a cause for concern and will resolve on its own.     RETURN APPOINTMENT    Follow up with your surgeon in 2 weeks.  Please call the office at [phone] to schedule your appointment.      CALL OUR OFFICE -[phone] IF YOU HAVE:     Chills or fever above 101 F.    Increased redness, warmth, or drainage at your incisions.    Significant bleeding.    Pain not relieved by your pain medication or rest.    Increasing pain after the first 48 hours.    Any other concerns or questions.    Revised October 2018  **If you have questions or concerns about your procedure,   call Dr. Lester at [phone]**

## 2018-12-05 NOTE — ANESTHESIA PREPROCEDURE EVALUATION
Anesthesia Evaluation     .             ROS/MED HX    ENT/Pulmonary:      (-) sleep apnea   Neurologic:     (+)migraines,     Cardiovascular:  - neg cardiovascular ROS       METS/Exercise Tolerance:     Hematologic:  - neg hematologic  ROS       Musculoskeletal:  - neg musculoskeletal ROS       GI/Hepatic:        (-) GERD   Renal/Genitourinary:  - ROS Renal section negative       Endo:  - neg endo ROS       Psychiatric:     (+) psychiatric history depression      Infectious Disease:  - neg infectious disease ROS       Malignancy:         Other:                     Physical Exam  Normal systems: cardiovascular, pulmonary and dental    Airway   Mallampati: II  TM distance: >3 FB  Neck ROM: full    Dental     Cardiovascular       Pulmonary                     Anesthesia Plan      History & Physical Review  History and physical reviewed and following examination; no interval change.    ASA Status:  2 .    NPO Status:  > 8 hours    Plan for General and LMA with Intravenous induction. Maintenance will be Balanced.    PONV prophylaxis:  Ondansetron (or other 5HT-3)  Background propofol      Postoperative Care  Postoperative pain management:  IV analgesics.      Consents  Anesthetic plan, risks, benefits and alternatives discussed with:  Patient..          Procedure: Procedure(s):  SEED LOCALIZED LEFT BREAST MASS EXCISION  Preop diagnosis: LEFT BREAST MASS    Allergies   Allergen Reactions     Nkda [No Known Drug Allergies]      Past Medical History:   Diagnosis Date     Contraceptive management     BCP d/c      Migraine headache     sees neurologist     Mild major depression (H)      Tubal ligation status     Essure     Past Surgical History:   Procedure Laterality Date     C  DELIVERY ONLY       HC HYSTEROS W PERMANENT FALLOPAIN IMPLANT  4-3-2012    Essure     LASIK       Social History   Substance Use Topics     Smoking status: Never Smoker     Smokeless tobacco: Never Used     Alcohol use 0.0  oz/week     0 Standard drinks or equivalent per week      Comment: 1-2 drinks per week     Prior to Admission medications    Medication Sig Start Date End Date Taking? Authorizing Provider   EPINEPHrine (EPIPEN/ADRENACLICK/OR ANY BX GENERIC EQUIV) 0.3 MG/0.3ML injection 2-pack Inject 0.3 mLs (0.3 mg) into the muscle as needed for anaphylaxis 11/7/18   Mariano Ayala NP   hydrOXYzine (ATARAX) 25 MG tablet Take 1-2 tablets (25-50 mg) by mouth every 6 hours as needed for itching 11/19/18   Mariano Ayala NP   ketorolac (TORADOL) 30 MG/ML injection Inject 2 mLs into the muscle as needed.    Reported, Patient   multivitamin, therapeutic with minerals (MULTI-VITAMIN) TABS Take 1 tablet by mouth daily    Reported, Patient   rizatriptan (MAXALT) 10 MG tablet Take 1 tablet (10 mg) by mouth at onset of headache for migraine May repeat dose in 2 hours.  Do not exceed 30 mg in 24 hours 4/22/15   Elyssa Lucero APRN CNP   topiramate (TOPAMAX) 25 MG capsule Take 4 capsules by mouth At Bedtime. 10/5/12   Elyssa Lucero APRN CNP   triamcinolone (KENALOG) 0.1 % cream Apply sparingly to affected area three times daily for 14 days. 11/1/18   Mariano Ayala NP   venlafaxine (EFFEXOR-XR) 75 MG 24 hr capsule TAKE ONE CAPSULE BY MOUTH EVERY DAY 6/15/17   Bailey Hernández MD     Current Facility-Administered Medications Ordered in Epic   Medication Dose Route Frequency Last Rate Last Dose     ceFAZolin (ANCEF) 1 g vial to attach to  ml bag for ADULT or 50 ml bag for PEDS  1 g Intravenous See Admin Instructions         ceFAZolin (ANCEF) intermittent infusion 2 g in 100 mL dextrose PRE-MIX  2 g Intravenous Pre-Op/Pre-procedure x 1 dose         No current [facility]-ordered outpatient prescriptions on file.       Wt Readings from Last 1 Encounters:   12/05/18 92.5 kg (204 lb)     Temp Readings from Last 1 Encounters:   12/05/18 36.7  C (98  F) (Oral)     BP Readings from Last 6 Encounters:   12/05/18 126/74   11/29/18 120/70   11/08/18  100/62   11/07/18 116/76   11/01/18 117/74   09/21/16 107/69     Pulse Readings from Last 4 Encounters:   11/29/18 87   11/08/18 92   11/07/18 83   11/01/18 78     Resp Readings from Last 1 Encounters:   12/05/18 20     SpO2 Readings from Last 1 Encounters:   12/05/18 100%     Recent Labs   Lab Test  11/01/18   0921  04/22/15   0756  07/15/14   0754  05/08/14   0744   NA   --   137   --   143   POTASSIUM   --   4.0   --   4.4   CHLORIDE   --   104   --   107   CO2   --   27   --   24   ANIONGAP   --   6   --   12   GLC  93  86   --   83   BUN   --   16   --   20   CR   --   0.98  1.09*  1.07*   MARK   --   9.1   --   9.1     Recent Labs   Lab Test  05/08/14   0744   AST  24   ALT  22   ALKPHOS  69   BILITOTAL  0.3     Recent Labs   Lab Test  11/01/18   0921  09/21/16   1159  04/22/15   0756   WBC   --   6.4  7.4   HGB  12.8  13.6  14.2   PLT   --   282  274     No results for input(s): ABO, RH in the last 55026 hours.  Recent Labs   Lab Test  09/21/16   1159   INR  0.97      No results for input(s): TROPI in the last 85077 hours.  No results for input(s): PH, PCO2, PO2, HCO3 in the last 91743 hours.  No results for input(s): HCG in the last 07517 hours.  Recent Results (from the past 744 hour(s))   MA Post Procedure Left    Narrative    LEFT BREAST SEED LOCALIZATION;  POST SEED PLACEMENT LEFT DIGITAL MAMMOGRAM;  12/4/2018 11:00 AM     HISTORY: Focal left breast acute and chronic inflammatory change.  Patient is undergoing excision.    COMPARISON: Left breast ultrasound, 11/2/2018    PROCEDURE:  Informed consent was obtained prior to the procedure.  The  skin overlying the target lesion was marked, sterilized and  anesthetized using approximately 3 mL 1% lidocaine.  Using continuous  imaging guidance, the radioactive seed was deployed within the target  lesion.  Adequate placement was confirmed with imaging.  There were no  complications.    Post procedure mammogram confirms satisfactory positioning of the  seed.  The  images were annotated and provided to the surgeon.      Impression    IMPRESSION: Successful placement of a radioactive seed for operative  guidance.      ACE FERRO MD   US Breast Radioactive Seed Placement, 1St Lesion Left    Narrative    LEFT BREAST SEED LOCALIZATION;  POST SEED PLACEMENT LEFT DIGITAL MAMMOGRAM;  12/4/2018 11:00 AM     HISTORY: Focal left breast acute and chronic inflammatory change.  Patient is undergoing excision.    COMPARISON: Left breast ultrasound, 11/2/2018    PROCEDURE:  Informed consent was obtained prior to the procedure.  The  skin overlying the target lesion was marked, sterilized and  anesthetized using approximately 3 mL 1% lidocaine.  Using continuous  imaging guidance, the radioactive seed was deployed within the target  lesion.  Adequate placement was confirmed with imaging.  There were no  complications.    Post procedure mammogram confirms satisfactory positioning of the  seed.  The images were annotated and provided to the surgeon.      Impression    IMPRESSION: Successful placement of a radioactive seed for operative  guidance.      ACE FERRO MD       RECENT LABS:   ECG:   ECHO:                     .

## 2018-12-05 NOTE — ANESTHESIA POSTPROCEDURE EVALUATION
Patient: Nicole Lee Meester    Procedure(s):  SEED LOCALIZED LEFT BREAST MASS EXCISION    Diagnosis:LEFT BREAST MASS  Diagnosis Additional Information: No value filed.    Anesthesia Type:  General, LMA    Note:  Anesthesia Post Evaluation    Patient location during evaluation: bedside  Patient participation: Able to fully participate in evaluation  Level of consciousness: awake  Pain management: adequate  Airway patency: patent  Cardiovascular status: acceptable  Respiratory status: acceptable  Hydration status: acceptable  PONV: none     Anesthetic complications: None    Comments: No anesthetic complications noted.         Last vitals:  Vitals:    12/05/18 1243 12/05/18 1245 12/05/18 1337   BP:  136/77 138/67   Resp:  9 12   Temp: 36.1  C (97  F)     SpO2:  99% 99%         Electronically Signed By: Roger Lee DO, DO  December 5, 2018  3:11 PM

## 2018-12-05 NOTE — IP AVS SNAPSHOT
MRN:[id number]                      After Visit Summary   12/5/2018    Nicole Lee Meester    MRN: [id number]           Thank you!     Thank you for choosing [facility] for your care. Our goal is always to provide you with excellent care. Hearing back from our patients is one way we can continue to improve our services. Please take a few minutes to complete the written survey that you may receive in the mail after you visit with us. Thank you!        Patient Information     Date Of Birth          1975        About your hospital stay     You were admitted on:  December 5, 2018 You last received care in the:  [facility]    You were discharged on:  December 5, 2018       Who to Call     For medical emergencies, please call 911.  For non-urgent questions about your medical care, please call your primary care provider or clinic, [phone]  For questions related to your surgery, please call your surgery clinic        Attending Provider     Provider Specialty    Tam Lester MD Surgery       Primary Care Provider Office Phone # Fax #    Mariano Ayala [phone]      Further instructions from your care team       Same Day Surgery Discharge Instructions for  Sedation and General Anesthesia       It's not unusual to feel dizzy, light-headed or faint for up to 24 hours after surgery or while taking pain medication.  If you have these symptoms: sit for a few minutes before standing and have someone assist you when you get up to walk or use the bathroom.      You should rest and relax for the next 24 hours. We recommend you make arrangements to have an adult stay with you for at least 24 hours after your discharge.  Avoid hazardous and strenuous activity.      DO NOT DRIVE any vehicle or operate mechanical equipment for 24 hours following the end of your surgery.  Even though you may feel normal, your reactions may be affected by the medication you  have received.      Do not drink alcoholic beverages for 24 hours following surgery.       Slowly progress to your regular diet as you feel able. It's not unusual to feel nauseated and/or vomit after receiving anesthesia.  If you develop these symptoms, drink clear liquids (apple juice, ginger ale, broth, 7-up, etc. ) until you feel better.  If your nausea and vomiting persists for 24 hours, please notify your surgeon.        All narcotic pain medications, along with inactivity and anesthesia, can cause constipation. Drinking plenty of liquids and increasing fiber intake will help.      For any questions of a medical nature, call your surgeon.      Do not make important decisions for 24 hours.      If you had general anesthesia, you may have a sore throat for a couple of days related to the breathing tube used during surgery.  You may use Cepacol lozenges to help with this discomfort.  If it worsens or if you develop a fever, contact your surgeon.       If you feel your pain is not well managed with the pain medications prescribed by your surgeon, please contact your surgeon's office to let them know so they can address your concerns.         [facility]  Discharge Instructions: Post-Operative Breast Surgery    ACTIVITY    Take frequent short walks and increase your activity gradually.      Avoid strenuous physical activity or heavy lifting greater than 15-20 lbs. for 1-2 weeks with arm on the surgery side.  You may climb stairs.    Gentle rotation and stretching of your arms and shoulders will prevent joint stiffness.    You may drive without restrictions when you are not using any prescription pain medication and feel comfortable in a car.    You may return to work/school when you are comfortable without any prescription pain medication.    WOUND CARE    You may remove your outer dressing and shower 48 hours after the surgery.  Pat your incisions dry and leave them open to  air.  Re-apply dressing (Band-Aids or gauze/tape) as needed for drainage.    You may have steri-strips (looks like white tape) on your incisions.  You may peel off the steri-strips 2 weeks after your surgery if they have not peeled off on their own.     Do not soak your incisions in a tub or pool for 2 weeks.     Do not apply any lotions, creams, or ointments to your incisions.    A ridge under your incisions is normal and will gradually resolve.    Wear a supportive bra for 1-2 weeks, day and night.    DIET    Start with liquids, then gradually resume your regular diet as tolerated.     Drink plenty of liquids to stay hydrated.    PAIN    Expect some tenderness and discomfort at the incision site(s).  Use the prescribed pain medication at your discretion.  Expect gradual resolution of your pain over several days.    You may take ibuprofen with food (unless you have been told not to) instead of or in addition to your prescribed pain medication.  If you are taking Norco or Percocet, do not take any additional acetaminophen/APAP/Tylenol.    Do not drink alcohol or drive while you are taking pain medications.    You may apply ice to your incisions in 20 minute intervals as needed for the next 48 hours.      EXPECTATIONS    Pain medications can cause constipation.  Limit use when possible.  Take over the counter stool softener/stimulant, such as Colace or Senna, 1-2 times a day with plenty of water.  You may take a mild over the counter laxative, such as Miralax or a suppository, as needed.      You may discontinue these medications once you are having regular bowel movements and/or are no longer taking your narcotic pain medication.    Blue dye may have been used during your surgery to locate lymph nodes and can cause your urine to be blue/green for several days after surgery.  This is not a cause for concern and will resolve on its own.     RETURN APPOINTMENT    Follow up with your surgeon in 2 weeks.  Please call the  "office at [phone] to schedule your appointment.      CALL OUR OFFICE -[phone] IF YOU HAVE:     Chills or fever above 101 F.    Increased redness, warmth, or drainage at your incisions.    Significant bleeding.    Pain not relieved by your pain medication or rest.    Increasing pain after the first 48 hours.    Any other concerns or questions.    Revised October 2018  **If you have questions or concerns about your procedure,   call Dr. Lester at [phone]**    Pending Results     Date and Time Order Name Status Description    12/5/2018 1127 Surgical pathology exam In process             Admission Information     Date & Time Provider Department Dept. Phone    12/5/2018 Tam Lester MD [facility] Same Day Surgery [phone]      Your Vitals Were     Blood Pressure Temperature Respirations Height Weight Last Period    129/85 97  F (36.1  C) (Temporal) 15 1.702 m (5' 7\") 92.5 kg (204 lb) 11/17/2018    Pulse Oximetry BMI (Body Mass Index)                98% 31.95 kg/m2          [employer] Information     [employer] gives you secure access to your electronic health record. If you see a primary care provider, you can also send messages to your care team and make appointments. If you have questions, please call your primary care clinic.  If you do not have a primary care provider, please call [phone] and they will assist you.        Care EveryWhere ID     This is your Care EveryWhere ID. This could be used by other organizations to access your [place] medical records  [id number]        Equal Access to Services     [facility]: Hadii aad ku hadasho Somark anthonyali, waaxda luqadaha, qaybta kaalmada adeegyada, [place] cheng. So [facility] [phone].    ATENCIÓN: Si habla español, tiene a hawkins disposición servicios gratuitos de asistencia lingüística. Llame al [phone].    We comply with applicable federal civil rights laws and [place] laws. We do not discriminate on " the basis of race, color, national origin, age, disability, sex, sexual orientation, or gender identity.               Review of your medicines      START taking        Dose / Directions    HYDROcodone-acetaminophen 5-325 MG tablet   Commonly known as:  NORCO   Used for:  Left breast mass        Dose:  1-2 tablet   Take 1-2 tablets by mouth every 4 hours as needed for moderate to severe pain   Quantity:  15 tablet   Refills:  0       senna-docusate 8.6-50 MG tablet   Commonly known as:  SENOKOT-S/PERICOLACE   Used for:  Left breast mass        Dose:  1-2 tablet   Take 1-2 tablets by mouth 2 times daily as needed for constipation   Quantity:  30 tablet   Refills:  0         CONTINUE these medicines which have NOT CHANGED        Dose / Directions    EPINEPHrine 0.3 MG/0.3ML injection 2-pack   Commonly known as:  EPIPEN/ADRENACLICK/or ANY BX GENERIC EQUIV   Used for:  Hives        Dose:  0.3 mg   Inject 0.3 mLs (0.3 mg) into the muscle as needed for anaphylaxis   Quantity:  0.6 mL   Refills:  0       hydrOXYzine 25 MG tablet   Commonly known as:  ATARAX   Used for:  Hives        Dose:  25-50 mg   Take 1-2 tablets (25-50 mg) by mouth every 6 hours as needed for itching   Quantity:  60 tablet   Refills:  1       ketorolac 30 MG/ML injection   Commonly known as:  TORADOL        Dose:  2 mL   Inject 2 mLs into the muscle as needed.   Refills:  0       topiramate 25 MG capsule   Commonly known as:  TOPAMAX   Used for:  Migraine headache        Dose:  100 mg   Take 4 capsules by mouth At Bedtime.   Quantity:  120 capsule   Refills:  6       triamcinolone 0.1 % external cream   Commonly known as:  KENALOG   Used for:  Pruritic disorder        Apply sparingly to affected area three times daily for 14 days.   Quantity:  15 g   Refills:  0       venlafaxine 75 MG 24 hr capsule   Commonly known as:  EFFEXOR-XR   Used for:  Mild single current episode of major depressive disorder (H)        TAKE ONE CAPSULE BY MOUTH EVERY DAY    Quantity:  90 capsule   Refills:  0            Where to get your medicines      These medications were sent to [facility] [place], [place] - [address]  [address], [place] [address]     Phone:  [phone]     senna-docusate 8.6-50 MG tablet         Some of these will need a paper prescription and others can be bought over the counter. Ask your nurse if you have questions.     Bring a paper prescription for each of these medications     HYDROcodone-acetaminophen 5-325 MG tablet                Protect others around you: Learn how to safely use, store and throw away your medicines at www.disposemymeds.org.        Information about OPIOIDS     PRESCRIPTION OPIOIDS: WHAT YOU NEED TO KNOW   We gave you an opioid (narcotic) pain medicine. It is important to manage your pain, but opioids are not always the best choice. You should first try all the other options your care team gave you. Take this medicine for as short a time (and as few doses) as possible.    Some activities can increase your pain, such as bandage changes or therapy sessions. It may help to take your pain medicine 30 to 60 minutes before these activities. Reduce your stress by getting enough sleep, working on hobbies you enjoy and practicing relaxation or meditation. Talk to your care team about ways to manage your pain beyond prescription opioids.    These medicines have risks:    DO NOT drive when on new or higher doses of pain medicine. These medicines can affect your alertness and reaction times, and you could be arrested for driving under the influence (DUI). If you need to use opioids long-term, talk to your care team about driving.    DO NOT operate heavy machinery    DO NOT do any other dangerous activities while taking these medicines.    DO NOT drink any alcohol while taking these medicines.     If the opioid prescribed includes acetaminophen, DO NOT take with any other medicines that contain acetaminophen. Read  all labels carefully. Look for the word  acetaminophen  or  Tylenol.  Ask your pharmacist if you have questions or are unsure.    You can get addicted to pain medicines, especially if you have a history of addiction (chemical, alcohol or substance dependence). Talk to your care team about ways to reduce this risk.    All opioids tend to cause constipation. Drink plenty of water and eat foods that have a lot of fiber, such as fruits, vegetables, prune juice, apple juice and high-fiber cereal. Take a laxative (Miralax, milk of magnesia, Colace, Senna) if you don t move your bowels at least every other day. Other side effects include upset stomach, sleepiness, dizziness, throwing up, tolerance (needing more of the medicine to have the same effect), physical dependence and slowed breathing.    Store your pills in a secure place, locked if possible. We will not replace any lost or stolen medicine. If you don t finish your medicine, please throw away (dispose) as directed by your pharmacist. The [place] Pollution Control Agency has more information about safe disposal: https://[email or web address]/living-green/managing-unwanted-medications             Medication List: This is a list of all your medications and when to take them. Check marks below indicate your daily home schedule. Keep this list as a reference.      Medications           Morning Afternoon Evening Bedtime As Needed    EPINEPHrine 0.3 MG/0.3ML injection 2-pack   Commonly known as:  EPIPEN/ADRENACLICK/or ANY BX GENERIC EQUIV   Inject 0.3 mLs (0.3 mg) into the muscle as needed for anaphylaxis                                HYDROcodone-acetaminophen 5-325 MG tablet   Commonly known as:  NORCO   Take 1-2 tablets by mouth every 4 hours as needed for moderate to severe pain   Last time this was given:  1 tablet on 12/5/2018 12:36 PM   Next Dose Due:  After 4:36 PM 12/5                                hydrOXYzine 25 MG tablet   Commonly known as:  ATARAX   Take  1-2 tablets (25-50 mg) by mouth every 6 hours as needed for itching                                ketorolac 30 MG/ML injection   Commonly known as:  TORADOL   Inject 2 mLs into the muscle as needed.                                senna-docusate 8.6-50 MG tablet   Commonly known as:  SENOKOT-S/PERICOLACE   Take 1-2 tablets by mouth 2 times daily as needed for constipation                                topiramate 25 MG capsule   Commonly known as:  TOPAMAX   Take 4 capsules by mouth At Bedtime.                                triamcinolone 0.1 % external cream   Commonly known as:  KENALOG   Apply sparingly to affected area three times daily for 14 days.                                venlafaxine 75 MG 24 hr capsule   Commonly known as:  EFFEXOR-XR   TAKE ONE CAPSULE BY MOUTH EVERY DAY

## 2018-12-06 LAB — COPATH REPORT: NORMAL

## 2018-12-20 ENCOUNTER — OFFICE VISIT (OUTPATIENT)
Dept: SURGERY | Facility: CLINIC | Age: 43
End: 2018-12-20
Payer: COMMERCIAL

## 2018-12-20 DIAGNOSIS — Z09 SURGERY FOLLOW-UP EXAMINATION: Primary | ICD-10-CM

## 2018-12-20 PROCEDURE — 99024 POSTOP FOLLOW-UP VISIT: CPT | Performed by: SURGERY

## 2018-12-20 NOTE — LETTER
2018    RE: Nicole Meester, : 1975      Surgery Postoperative Note     Doing well. Some pain but improving. No other issues.     Breast- incision well healed. No ecchymosis. No seroma or hematoma.      A/P  Nicole Lee Meester is recovering well from a Lumpectomy. Final path shows all benign pathology. She can go back to routine screening. I advised her to slowly return to regular activity. I expect she will make a complete recovery without issues.     Thank you for the opportunity to help in her care.     Tam Lester M.D.  [facility]  [phone]

## 2019-01-11 ENCOUNTER — TRANSFERRED RECORDS (OUTPATIENT)
Dept: HEALTH INFORMATION MANAGEMENT | Facility: CLINIC | Age: 44
End: 2019-01-11

## 2019-05-05 ENCOUNTER — MYC MEDICAL ADVICE (OUTPATIENT)
Dept: FAMILY MEDICINE | Facility: CLINIC | Age: 44
End: 2019-05-05

## 2019-05-05 DIAGNOSIS — L50.9 HIVES: Primary | ICD-10-CM

## 2019-05-05 DIAGNOSIS — T78.3XXA ANGIOEDEMA, INITIAL ENCOUNTER: ICD-10-CM

## 2019-05-06 NOTE — TELEPHONE ENCOUNTER
Please see Generic [employer] message below and advise.   Kimberley White RN   [facility] - Triage

## 2019-05-23 ENCOUNTER — TRANSFERRED RECORDS (OUTPATIENT)
Dept: HEALTH INFORMATION MANAGEMENT | Facility: CLINIC | Age: 44
End: 2019-05-23

## 2019-05-29 ENCOUNTER — HOSPITAL ENCOUNTER (OUTPATIENT)
Dept: MAMMOGRAPHY | Facility: CLINIC | Age: 44
End: 2019-05-29
Attending: NURSE PRACTITIONER
Payer: COMMERCIAL

## 2019-05-29 ENCOUNTER — TELEPHONE (OUTPATIENT)
Dept: FAMILY MEDICINE | Facility: CLINIC | Age: 44
End: 2019-05-29

## 2019-05-29 ENCOUNTER — HOSPITAL ENCOUNTER (OUTPATIENT)
Dept: MAMMOGRAPHY | Facility: CLINIC | Age: 44
Discharge: HOME | End: 2019-05-29
Attending: NURSE PRACTITIONER | Admitting: NURSE PRACTITIONER
Payer: COMMERCIAL

## 2019-05-29 DIAGNOSIS — N63.0 LUMP OR MASS IN BREAST: Primary | ICD-10-CM

## 2019-05-29 DIAGNOSIS — N63.0 LUMP OR MASS IN BREAST: ICD-10-CM

## 2019-05-29 DIAGNOSIS — Z12.39 BREAST CANCER SCREENING: ICD-10-CM

## 2019-05-29 PROCEDURE — 76642 ULTRASOUND BREAST LIMITED: CPT | Mod: LT

## 2019-05-29 PROCEDURE — G0279 TOMOSYNTHESIS, MAMMO: HCPCS

## 2019-05-29 PROCEDURE — 77066 DX MAMMO INCL CAD BI: CPT

## 2019-05-29 NOTE — TELEPHONE ENCOUNTER
New lump on the left side. Approx 1 month and pain. Not same location as December. December was also on the left.    Need order for diagnostic bilateral. US on the left for new lump     Order for any additional follow up based on radiology recommendation would be helpful.     Order pended. Patient is at the breast center waiting. Lucy will continue to watch for order in Epic.  Radha Rojas RN

## 2019-08-01 ENCOUNTER — OFFICE VISIT (OUTPATIENT)
Dept: FAMILY MEDICINE | Facility: CLINIC | Age: 44
End: 2019-08-01
Payer: COMMERCIAL

## 2019-08-01 ENCOUNTER — ANCILLARY PROCEDURE (OUTPATIENT)
Dept: GENERAL RADIOLOGY | Facility: CLINIC | Age: 44
End: 2019-08-01
Attending: NURSE PRACTITIONER
Payer: COMMERCIAL

## 2019-08-01 VITALS
OXYGEN SATURATION: 98 % | TEMPERATURE: 98.6 F | HEART RATE: 86 BPM | DIASTOLIC BLOOD PRESSURE: 72 MMHG | WEIGHT: 186 LBS | BODY MASS INDEX: 29.13 KG/M2 | SYSTOLIC BLOOD PRESSURE: 100 MMHG

## 2019-08-01 DIAGNOSIS — R07.89 ATYPICAL CHEST PAIN: ICD-10-CM

## 2019-08-01 DIAGNOSIS — N64.4 BREAST PAIN: Primary | ICD-10-CM

## 2019-08-01 PROCEDURE — 99214 OFFICE O/P EST MOD 30 MIN: CPT | Performed by: NURSE PRACTITIONER

## 2019-08-01 PROCEDURE — 71046 X-RAY EXAM CHEST 2 VIEWS: CPT | Mod: FY

## 2019-08-01 ASSESSMENT — ANXIETY QUESTIONNAIRES
7. FEELING AFRAID AS IF SOMETHING AWFUL MIGHT HAPPEN: NOT AT ALL
GAD7 TOTAL SCORE: 2
3. WORRYING TOO MUCH ABOUT DIFFERENT THINGS: SEVERAL DAYS
6. BECOMING EASILY ANNOYED OR IRRITABLE: SEVERAL DAYS
2. NOT BEING ABLE TO STOP OR CONTROL WORRYING: NOT AT ALL
5. BEING SO RESTLESS THAT IT IS HARD TO SIT STILL: NOT AT ALL
IF YOU CHECKED OFF ANY PROBLEMS ON THIS QUESTIONNAIRE, HOW DIFFICULT HAVE THESE PROBLEMS MADE IT FOR YOU TO DO YOUR WORK, TAKE CARE OF THINGS AT HOME, OR GET ALONG WITH OTHER PEOPLE: NOT DIFFICULT AT ALL
1. FEELING NERVOUS, ANXIOUS, OR ON EDGE: NOT AT ALL

## 2019-08-01 ASSESSMENT — PATIENT HEALTH QUESTIONNAIRE - PHQ9
SUM OF ALL RESPONSES TO PHQ QUESTIONS 1-9: 3
5. POOR APPETITE OR OVEREATING: NOT AT ALL

## 2019-08-01 NOTE — PROGRESS NOTES
"Subjective     Nicole Lee Meester is a 44 year old female who presents to clinic today for the following health issues:    Concern - Pt states her left breast has been painful. Had a abscess removed in Dec., never felt much better. Constant achy pain.     HPI: Beth presents today for ongoing left breast pain. For history, she was found to have an abscess in that breast last winter which was surgically removed. This \"new\" discomfort began shortly after that procedure. This prompted mammogram and ultrasound in May (only abnormal finding was 0.5 mm cyst). No bleeding, discharge, palpable mass. No swelling or skin color / temperature change. No improvement since onset. No constitutional symptoms. No chest pain / pressure or other classic symptoms of myocardial ischemia. No cough or other respiratory complaint. No injury to that breast. Tender to pressure of any kind.       Patient Active Problem List   Diagnosis     CARDIOVASCULAR SCREENING; LDL GOAL LESS THAN 160     Intractable migraine without aura and with status migrainosus     Mild single current episode of major depressive disorder (H)     Past Surgical History:   Procedure Laterality Date     BIOPSY BREAST SEED LOCALIZATION Left 2018    Procedure: SEED LOCALIZED LEFT BREAST MASS EXCISION;  Surgeon: Tam Lester MD;  Location: [facility]  DELIVERY ONLY       HC HYSTEROS W PERMANENT FALLOPAIN IMPLANT  4-3-2012    Essure     LASIK         Social History     Tobacco Use     Smoking status: Never Smoker     Smokeless tobacco: Never Used   Substance Use Topics     Alcohol use: Yes     Alcohol/week: 0.0 oz     Comment: 1-2 drinks per week     Family History   Problem Relation Age of Onset     Lipids Father      Myocardial Infarction Father 53     Cancer Father 55        small cell lung     Breast Cancer Maternal Grandmother         at age 60, survivor     Skin Cancer Maternal Grandmother         melanoma, age 80's     Breast Cancer " Paternal Grandmother         at age 60, survivor     Thyroid Disease Paternal Grandmother      Lipids Paternal Grandmother      Respiratory Paternal Grandmother         COPD,      Alcohol/Drug Paternal Grandfather      Lipids Sister              Reviewed and updated as needed this visit by Provider  Tobacco  Allergies  Meds  Problems  Med Hx  Surg Hx  Fam Hx         Review of Systems   ROS COMP: Constitutional, HEENT, cardiovascular, pulmonary, musculoskeletal systems are negative, except as otherwise noted.      Objective    /72 (BP Location: Left arm, Patient Position: Chair, Cuff Size: Adult Regular)   Pulse 86   Temp 98.6  F (37  C) (Tympanic)   Wt 84.4 kg (186 lb)   LMP 2019   SpO2 98%   BMI 29.13 kg/m    Body mass index is 29.13 kg/m .  Physical Exam   GENERAL: healthy, alert and no distress  NECK: no adenopathy, no asymmetry, masses, or scars and thyroid normal to palpation  RESP: lungs clear to auscultation - no rales, rhonchi or wheezes  BREAST: normal without masses, tenderness or nipple discharge and no palpable axillary masses or adenopathy  CV: regular rate and rhythm, normal S1 S2, no S3 or S4, no murmur, click or rub, no peripheral edema and peripheral pulses strong  MS: no gross musculoskeletal defects noted, no edema  SKIN: no suspicious lesions or rashes  NEURO: Normal strength and tone, mentation intact and speech normal  LYMPH: no cervical, supraclavicular, axillary adenopathy    Diagnostic Test Results:  CXR - Personally-reviewed. No evidence of infiltrate. Heart size normal.         Assessment & Plan     Beth was seen today for breast pain. Unclear what exactly is driving this new pain. I suspect that it must be related to instrumentation within that breast last winter given that it developed shortly after (muscle or nerve injury?). Reassuring that mammogram and ultrasound were generally unremarkable. Symptoms have not worsened since that time, so unlikely that  she has developed new mass / lump / malignancy. CXR fails to demonstrate pulmonary infiltrate or nodularity. Will send to breast center for further workup and treatment (if indicated). She agrees with this plan. Follow up as discussed.     Diagnoses and all orders for this visit:    Breast pain  -     BREAST CENTER REFERRAL    Atypical chest pain  -     XR Chest 2 Views; Future        See Patient Instructions    Return in about 4 weeks (around 8/29/2019) for persistent or worsening symptoms.    Mariano Ayala NP  [facility] [place]

## 2019-08-02 ASSESSMENT — ANXIETY QUESTIONNAIRES: GAD7 TOTAL SCORE: 2

## 2019-08-08 ENCOUNTER — OFFICE VISIT (OUTPATIENT)
Dept: SURGERY | Facility: CLINIC | Age: 44
End: 2019-08-08
Payer: COMMERCIAL

## 2019-08-08 VITALS
SYSTOLIC BLOOD PRESSURE: 110 MMHG | HEART RATE: 65 BPM | DIASTOLIC BLOOD PRESSURE: 72 MMHG | HEIGHT: 67 IN | WEIGHT: 180 LBS | OXYGEN SATURATION: 99 % | BODY MASS INDEX: 28.25 KG/M2

## 2019-08-08 DIAGNOSIS — N64.4 BREAST PAIN: Primary | ICD-10-CM

## 2019-08-08 PROCEDURE — 99213 OFFICE O/P EST LOW 20 MIN: CPT | Performed by: SURGERY

## 2019-08-08 ASSESSMENT — MIFFLIN-ST. JEOR: SCORE: 1509.1

## 2019-08-08 NOTE — LETTER
[facility]    [address], [address]  [place], [place] [address]  Phone [phone]  Fax [phone] [address], [address]  [place] [address]  Phone [phone]  Fax [phone]    [email or web address]     2019    Re: Beth Poseymin  : 1975    Surgery Note     Beth returns today for ongoing left breast pain.She is known to me as she had a left-sided breast cyst/abscess. She underwent a lumpectomy which showed acute and chronic inflammation without malignancy. She still complains of having left-sided breast discomfort. She describes as a generalized pain throughout the breast. She does not think its muscular in nature. It is constant and does not have any aggravating or alleviating factors. She drinks caffeine does not have any other risk factors. She recently had imaging studies failed to reveal any abnormalities. She has no other complaints today.     General- Alert and Oriented.  A bilateral breast exam was performed in the sitting and supine position. The hands were placed at this side and above the head. Bilateral breasts were palpated in a circumferential clockwise fashion including the supraclavicular and axillary areas. Left breast-incision completely healed. There is no palpable abnormalities or tenderness. No axillary adenopathy. Right breast without findings.     A/P-   Beth returns with ongoing breast pain of unknown etiology. I do not feel any abnormalities and her imaging do not suggest any abnormalities either. Her complaints are quite vague and I cannot pinpoint a specific area to targeted therapy at this time. I discussed lifestyle modifications which would be to wear a fitted bra, eliminate caffeine, and to use ibuprofen and use ice when she has pain. We also discussed referral to physical therapist as this could be muscular in nature given the chronic type pain she is  experiencing. She would like to try lifestyle modifications first and will call if she does not improve.      Tam Lester M.D.  [facility]  [phone]

## 2019-08-08 NOTE — PROGRESS NOTES
Surgery Note    Beth returns today for ongoing left breast pain.She is known to me as she had a left-sided breast cyst/abscess. She underwent a lumpectomy which showed acute and chronic inflammation without malignancy. She still complains of having left-sided breast discomfort. She describes as a generalized pain throughout the breast. She does not think its muscular in nature. It is constant and does not have any aggravating or alleviating factors. She drinks caffeine does not have any other risk factors. She recently had imaging studies failed to reveal any abnormalities. She has no other complaints today.    General- Alert and Oriented.  A bilateral breast exam was performed in the sitting and supine position. The hands were placed at this side and above the head. Bilateral breasts were palpated in a circumferential clockwise fashion including the supraclavicular and axillary areas. Left breast-incision completely healed. There is no palpable abnormalities or tenderness. No axillary adenopathy. Right breast without findings.    A/P-   Beth returns with ongoing breast pain of unknown etiology. I do not feel any abnormalities and her imaging do not suggest any abnormalities either. Her complaints are quite vague and I cannot pinpoint a specific area to targeted therapy at this time. I discussed lifestyle modifications which would be to wear a fitted bra, eliminate caffeine, and to use ibuprofen and use ice when she has pain. We also discussed referral to physical therapist as this could be muscular in nature given the chronic type pain she is experiencing. She would like to try lifestyle modifications first and will call if she does not improve.     Tam Lester M.D.  [facility]  [phone]

## 2019-11-02 ENCOUNTER — HEALTH MAINTENANCE LETTER (OUTPATIENT)
Age: 44
End: 2019-11-02

## 2020-01-13 ENCOUNTER — NURSE TRIAGE (OUTPATIENT)
Dept: FAMILY MEDICINE | Facility: CLINIC | Age: 45
End: 2020-01-13

## 2020-01-13 ENCOUNTER — OFFICE VISIT (OUTPATIENT)
Dept: FAMILY MEDICINE | Facility: CLINIC | Age: 45
End: 2020-01-13
Payer: COMMERCIAL

## 2020-01-13 VITALS
HEART RATE: 72 BPM | BODY MASS INDEX: 28.04 KG/M2 | WEIGHT: 179 LBS | SYSTOLIC BLOOD PRESSURE: 104 MMHG | TEMPERATURE: 97.7 F | DIASTOLIC BLOOD PRESSURE: 72 MMHG

## 2020-01-13 DIAGNOSIS — R10.9 FLANK PAIN: ICD-10-CM

## 2020-01-13 DIAGNOSIS — R10.11 RUQ ABDOMINAL PAIN: Primary | ICD-10-CM

## 2020-01-13 LAB
ALBUMIN UR-MCNC: NEGATIVE MG/DL
APPEARANCE UR: CLEAR
BILIRUB UR QL STRIP: NEGATIVE
COLOR UR AUTO: YELLOW
GLUCOSE UR STRIP-MCNC: NEGATIVE MG/DL
HGB UR QL STRIP: NEGATIVE
KETONES UR STRIP-MCNC: NEGATIVE MG/DL
LEUKOCYTE ESTERASE UR QL STRIP: NEGATIVE
LIPASE SERPL-CCNC: 156 U/L (ref 73–393)
NITRATE UR QL: NEGATIVE
PH UR STRIP: 7.5 PH (ref 5–7)
SOURCE: ABNORMAL
SP GR UR STRIP: 1.01 (ref 1–1.03)
UROBILINOGEN UR STRIP-ACNC: 0.2 EU/DL (ref 0.2–1)

## 2020-01-13 PROCEDURE — 36415 COLL VENOUS BLD VENIPUNCTURE: CPT | Performed by: NURSE PRACTITIONER

## 2020-01-13 PROCEDURE — 99214 OFFICE O/P EST MOD 30 MIN: CPT | Performed by: NURSE PRACTITIONER

## 2020-01-13 PROCEDURE — 80053 COMPREHEN METABOLIC PANEL: CPT | Performed by: NURSE PRACTITIONER

## 2020-01-13 PROCEDURE — 83690 ASSAY OF LIPASE: CPT | Performed by: NURSE PRACTITIONER

## 2020-01-13 PROCEDURE — 81003 URINALYSIS AUTO W/O SCOPE: CPT | Performed by: NURSE PRACTITIONER

## 2020-01-13 NOTE — TELEPHONE ENCOUNTER
"patient has appointment today with CHRIST Jeff CNP at 1:40- advised if worsening pain to go to the ER  patient verbalized understanding  Constant achy abdominal pain since Friday that worsens with eating-  diarrhea  Additional Information    Negative: Passed out (i.e., fainted, collapsed and was not responding)    Negative: Shock suspected (e.g., cold/pale/clammy skin, too weak to stand, low BP, rapid pulse)    Negative: Sounds like a life-threatening emergency to the triager    Negative: Chest pain    Negative: Pain is mainly in upper abdomen (if needed ask: 'is it mainly above the belly button?')    Negative: Abdominal pain and pregnant > 20 weeks    Negative: Abdominal pain and pregnant < 20 weeks    Negative: SEVERE abdominal pain (e.g., excruciating)    Negative: Vomiting red blood or black (coffee ground) material    Negative: Bloody, black, or tarry bowel movements    Constant abdominal pain lasting > 2 hours    Negative: Vomiting bile (green color)    Negative: Patient sounds very sick or weak to the triager    Negative: Blood in urine (red, pink, or tea-colored)    Negative: Fever > 103 F (39.4 C)    Negative: Fever > 100.0 F (37.8 C) and has diabetes mellitus or a weak immune system (e.g., HIV positive, cancer chemotherapy, organ transplant, splenectomy, chronic steroids)    Negative: Fever > 101 F (38.3 C) and over 60 years of age    Negative: Fever > 100.0 F (37.8 C) and bedridden (e.g., nursing home patient, stroke, chronic illness, recovering from surgery)    Negative: Pregnant or could be pregnant (i.e., missed last menstrual period)    MODERATE OR MILD pain that comes and goes (cramps) lasts > 24 hours    Negative: Age > 60 years    Negative: Vomiting and abdomen looks much more swollen than usual    Negative: White of the eyes have turned yellow (i.e., jaundice)    Answer Assessment - Initial Assessment Questions  1. LOCATION: \"Where does it hurt?\"       Right abdominal below unbilicus and " "middle abdominal-low back pain- right dise  2. RADIATION: \"Does the pain shoot anywhere else?\" (e.g., chest, back)      no  3. ONSET: \"When did the pain begin?\" (e.g., minutes, hours or days ago)       Friday afternoon  4. SUDDEN: \"Gradual or sudden onset?\"      sudden  5. PATTERN \"Does the pain come and go, or is it constant?\"     - If constant: \"Is it getting better, staying the same, or worsening?\"       (Note: Constant means the pain never goes away completely; most serious pain is constant and it progresses)      - If intermittent: \"How long does it last?\" \"Do you have pain now?\"      (Note: Intermittent means the pain goes away completely between bouts)  achey pain that worsen with eating  6. SEVERITY: \"How bad is the pain?\"  (e.g., Scale 1-10; mild, moderate, or severe)    - MILD (1-3): doesn't interfere with normal activities, abdomen soft and not tender to touch     - MODERATE (4-7): interferes with normal activities or awakens from sleep, tender to touch     - SEVERE (8-10): excruciating pain, doubled over, unable to do any normal activities       6  7. RECURRENT SYMPTOM: \"Have you ever had this type of abdominal pain before?\" If so, ask: \"When was the last time?\" and \"What happened that time?\"       none  8. CAUSE: \"What do you think is causing the abdominal pain?\"      unk  9. RELIEVING/AGGRAVATING FACTORS: \"What makes it better or worse?\" (e.g., movement, antacids, bowel movement)      Tums/kayopectate no relief  10. OTHER SYMPTOMS: \"Has there been any vomiting, diarrhea, constipation, or urine problems?\"        diarrhea   11. PREGNANCY: \"Is there any chance you are pregnant?\" \"When was your last menstrual period?\"    Protocols used: ABDOMINAL PAIN - FEMALE-A-OH    "

## 2020-01-13 NOTE — TELEPHONE ENCOUNTER
Pt calling with stomach and back pain since Friday. Increasing pain when she eats. Frequent diarrhea and constant pain  Transferring to triage.   Chyna Gomez,

## 2020-01-13 NOTE — PATIENT INSTRUCTIONS
Schedule RUQ ultrasound. I will follow up after I have the results. If needed, I will order surgery consult. If negative, we will look at other potential etiologies. Go to ED if this worsens significantly in the meantime.

## 2020-01-13 NOTE — PROGRESS NOTES
Subjective     Nicole Lee Meester is a 44 year old female who presents to clinic today for the following health issues:    HPI   Back Pain       Duration: Friday         Specific cause: none    Description:   Location of pain: low back right  Character of pain: achy   Pain radiation:none  New numbness or weakness in legs, not attributed to pain:  no     Intensity: moderate    History:   Pain interferes with job: YES  History of back problems: no prior back problems  Any previous MRI or X-rays: None  Sees a specialist for back pain:  No  Therapies tried without relief: tums, kaopectate     Alleviating factors:   Improved by: none       Precipitating factors:  Worsened by: food (eating)         Accompanying Signs & Symptoms:  Risk of Fracture:  None  Risk of Cauda Equina:  None  Risk of Infection:  None  Risk of Cancer:  None  Risk of Ankylosing Spondylitis:  Onset at age <35, male, AND morning back stiffness. no       HPI: Beth presents today with the complaint of RUQ and right flank pain. This issue is new for her and started on Friday. She states that it started out more in the right side of her abdomen, but is now more in her right flank.     She does note increased urinary frequency. No blood, malodor, sediment, fever, chills. No history of renal calculi or gall bladder issues. She does endorse nausea and increase in pain after meals. She rates the pain 6/10. Tender in RUQ. She does note diarrhea, as well, over these past few days.     Patient Active Problem List   Diagnosis     CARDIOVASCULAR SCREENING; LDL GOAL LESS THAN 160     Intractable migraine without aura and with status migrainosus     Mild single current episode of major depressive disorder (H)     Past Surgical History:   Procedure Laterality Date     BIOPSY BREAST SEED LOCALIZATION Left 2018    Procedure: SEED LOCALIZED LEFT BREAST MASS EXCISION;  Surgeon: Tam Lester MD;  Location: [facility]     C  DELIVERY ONLY         HYSTEROS W PERMANENT FALLOPAIN IMPLANT  4-3-2012    Essure     LASIK         Social History     Tobacco Use     Smoking status: Never Smoker     Smokeless tobacco: Never Used   Substance Use Topics     Alcohol use: Yes     Alcohol/week: 0.0 standard drinks     Comment: 1-2 drinks per week     Family History   Problem Relation Age of Onset     Lipids Father      Myocardial Infarction Father 53     Cancer Father 55        small cell lung     Breast Cancer Maternal Grandmother         at age 60, survivor     Skin Cancer Maternal Grandmother         melanoma, age 80's     Breast Cancer Paternal Grandmother         at age 60, survivor     Thyroid Disease Paternal Grandmother      Lipids Paternal Grandmother      Respiratory Paternal Grandmother         COPD,      Alcohol/Drug Paternal Grandfather      Lipids Sister            Reviewed and updated as needed this visit by Provider  Tobacco  Allergies  Meds  Problems  Med Hx  Surg Hx  Fam Hx         Review of Systems   ROS COMP: Constitutional, HEENT, cardiovascular, pulmonary, GI, , musculoskeletal, neuro, skin, endocrine and psych systems are negative, except as otherwise noted.      Objective    /72 (BP Location: Left arm, Patient Position: Chair, Cuff Size: Adult Regular)   Pulse 72   Temp 97.7  F (36.5  C) (Tympanic)   Wt 81.2 kg (179 lb)   LMP 2019   BMI 28.04 kg/m    Body mass index is 28.04 kg/m .  Physical Exam   GENERAL: healthy, alert and no distress  NECK: no adenopathy, no asymmetry, masses, or scars and thyroid normal to palpation  RESP: lungs clear to auscultation - no rales, rhonchi or wheezes  CV: regular rate and rhythm, normal S1 S2, no S3 or S4, no murmur, click or rub, no peripheral edema and peripheral pulses strong  ABDOMEN: Soft, no hepatosplenomegaly, no masses and bowel sounds normal; TTP in right upper quadrant; Negative Rovsing; Negative rebound tenderness.  NEURO: Normal strength and tone, mentation intact and  "speech normal    Diagnostic Test Results:  Results for orders placed or performed in visit on 01/13/20 (from the past 24 hour(s))   UA reflex to Microscopic and Culture   Result Value Ref Range    Color Urine Yellow     Appearance Urine Clear     Glucose Urine Negative NEG^Negative mg/dL    Bilirubin Urine Negative NEG^Negative    Ketones Urine Negative NEG^Negative mg/dL    Specific Gravity Urine 1.015 1.003 - 1.035    Blood Urine Negative NEG^Negative    pH Urine 7.5 (H) 5.0 - 7.0 pH    Protein Albumin Urine Negative NEG^Negative mg/dL    Urobilinogen Urine 0.2 0.2 - 1.0 EU/dL    Nitrite Urine Negative NEG^Negative    Leukocyte Esterase Urine Negative NEG^Negative    Source Midstream Urine            Assessment & Plan     Beth was seen today for back pain and abdominal pain. UA fails to demonstrate any evidence of UTI. Could be gastroenteritis, but diarrhea is not severe and there is no vomiting. Could be PUD / gastritis, but location of discomfort and involvement of lower GI tract seems to argue against this. I think it is worthwhile to order RUQ ultrasound given RUQ pain / tenderness and intensification of symptoms after meals. I will also check CMP and lipase today. I will follow up with her once I have these results in-hand. Discussed reasons to call or return to clinic. Beth acknowledges and demonstrates understanding of circumstances under which care should be sought urgently or emergently. Follow up as discussed.     Diagnoses and all orders for this visit:    RUQ abdominal pain  -     Comprehensive metabolic panel  -     US Abdomen Limited; Future  -     Lipase    Flank pain  -     UA reflex to Microscopic and Culture  -     Comprehensive metabolic panel     BMI:   Estimated body mass index is 28.04 kg/m  as calculated from the following:    Height as of 8/8/19: 1.702 m (5' 7\").    Weight as of this encounter: 81.2 kg (179 lb).   Weight management plan: Discussed healthy diet and exercise " guidelines        See Patient Instructions    Return in about 5 days (around 1/18/2020) for persistent or worsening symptoms, imaging study. Call [phone] to schedule this.    Mariano Ayala NP  [facility] [place]

## 2020-01-14 ENCOUNTER — TELEPHONE (OUTPATIENT)
Dept: FAMILY MEDICINE | Facility: CLINIC | Age: 45
End: 2020-01-14

## 2020-01-14 ENCOUNTER — HOSPITAL ENCOUNTER (OUTPATIENT)
Dept: ULTRASOUND IMAGING | Facility: CLINIC | Age: 45
Discharge: HOME | End: 2020-01-14
Attending: NURSE PRACTITIONER | Admitting: NURSE PRACTITIONER
Payer: COMMERCIAL

## 2020-01-14 DIAGNOSIS — R10.11 RUQ ABDOMINAL PAIN: ICD-10-CM

## 2020-01-14 DIAGNOSIS — K80.20 CALCULUS OF GALLBLADDER WITHOUT CHOLECYSTITIS WITHOUT OBSTRUCTION: Primary | ICD-10-CM

## 2020-01-14 LAB
ALBUMIN SERPL-MCNC: 3.9 G/DL (ref 3.4–5)
ALP SERPL-CCNC: 79 U/L (ref 40–150)
ALT SERPL W P-5'-P-CCNC: 14 U/L (ref 0–50)
ANION GAP SERPL CALCULATED.3IONS-SCNC: 5 MMOL/L (ref 3–14)
AST SERPL W P-5'-P-CCNC: 12 U/L (ref 0–45)
BILIRUB SERPL-MCNC: 0.2 MG/DL (ref 0.2–1.3)
BUN SERPL-MCNC: 16 MG/DL (ref 7–30)
CALCIUM SERPL-MCNC: 8.8 MG/DL (ref 8.5–10.1)
CHLORIDE SERPL-SCNC: 107 MMOL/L (ref 94–109)
CO2 SERPL-SCNC: 25 MMOL/L (ref 20–32)
CREAT SERPL-MCNC: 0.98 MG/DL (ref 0.52–1.04)
GFR SERPL CREATININE-BSD FRML MDRD: 70 ML/MIN/{1.73_M2}
GLUCOSE SERPL-MCNC: 86 MG/DL (ref 70–99)
POTASSIUM SERPL-SCNC: 4.5 MMOL/L (ref 3.4–5.3)
PROT SERPL-MCNC: 7.3 G/DL (ref 6.8–8.8)
SODIUM SERPL-SCNC: 137 MMOL/L (ref 133–144)

## 2020-01-14 PROCEDURE — 76705 ECHO EXAM OF ABDOMEN: CPT

## 2020-01-14 NOTE — TELEPHONE ENCOUNTER
Please call Beth and relate the following:     Dear Beth,    I have reviewed your labs and ultrasound.     - Your labs are normal, which indicates that you don't likely have an acute gall bladder attack, pancreatitis, kidney stone, or UTI happening.    - Your ultrasound does demonstrate that you have numerous small gallstones in your gall bladder. However, you do not appear to have any gall bladder thickening / inflammation or stones blocking the bile duct. It is hard to say if your symptoms are related to this or if this is an incidental finding (that could cause problems in the future). I will go ahead and order a general surgery referral, and you can visit with a surgeon about whether this would be an indication for gall bladder removal, or if it is better to wait.     If things change / worsen in the meantime, please let me know and we can consider next steps.     [facility] [place] [phone]    Thank you and have a great day!     Mariano Ayala NP on 1/14/2020 at 10:31 AM

## 2020-01-14 NOTE — TELEPHONE ENCOUNTER
Called patient and informed her of NP note below. Patient verbalized understanding and agrees with plan. Gave referral information as well.     Merary Comer RN, BSN  [facility] [place]

## 2020-01-15 ENCOUNTER — OFFICE VISIT (OUTPATIENT)
Dept: SURGERY | Facility: CLINIC | Age: 45
End: 2020-01-15
Attending: NURSE PRACTITIONER
Payer: COMMERCIAL

## 2020-01-15 ENCOUNTER — PREP FOR PROCEDURE (OUTPATIENT)
Dept: SURGERY | Facility: CLINIC | Age: 45
End: 2020-01-15

## 2020-01-15 VITALS
WEIGHT: 179 LBS | HEIGHT: 67 IN | DIASTOLIC BLOOD PRESSURE: 68 MMHG | BODY MASS INDEX: 28.09 KG/M2 | SYSTOLIC BLOOD PRESSURE: 112 MMHG | HEART RATE: 90 BPM

## 2020-01-15 DIAGNOSIS — K80.20 CALCULUS OF GALLBLADDER WITHOUT CHOLECYSTITIS WITHOUT OBSTRUCTION: ICD-10-CM

## 2020-01-15 DIAGNOSIS — K80.20 CHOLELITHIASIS: Primary | ICD-10-CM

## 2020-01-15 PROCEDURE — 99215 OFFICE O/P EST HI 40 MIN: CPT | Performed by: SURGERY

## 2020-01-15 ASSESSMENT — MIFFLIN-ST. JEOR: SCORE: 1504.57

## 2020-01-16 PROBLEM — K80.20 CHOLELITHIASIS: Status: ACTIVE | Noted: 2020-01-16

## 2020-01-16 NOTE — PROGRESS NOTES
We are asked by Dr. Ayala to see Ms. Meester in consultation concerning gallbladder disease  Chief complaint:  Abdominal pain, right upper quadrant    HPI:  This patient is a 44 year old female who presents with intermittent right upper quadrant pain for 1 week.  The pain is associated with eating fatty foods.  Additional associated symptoms include with nausea and anorexia.  She  does not have a history of jaundice or dark urine.  She  has not had pancreatitis in the past.        Past Medical History:   has a past medical history of Contraceptive management (), Hives, Migraine headache, Mild major depression (H), and Tubal ligation status.    Past Surgical History:  Past Surgical History:   Procedure Laterality Date     BIOPSY BREAST SEED LOCALIZATION Left 2018    Procedure: SEED LOCALIZED LEFT BREAST MASS EXCISION;  Surgeon: Tam Lester MD;  [facility]  [facility]     C  DELIVERY ONLY       HC HYSTEROS W PERMANENT FALLOPAIN IMPLANT  4-3-2012    [facility]     LASIK        Additional abdominal operations: none    Social History:  Social History     Socioeconomic History     Marital status:      Spouse name: James     Number of children: 1     Years of education: 18     Highest education level: Not on file   Occupational History     Occupation:    Social Needs     Financial resource strain: Not on file     Food insecurity:     Worry: Not on file     Inability: Not on file     Transportation needs:     Medical: Not on file     Non-medical: Not on file   Tobacco Use     Smoking status: Never Smoker     Smokeless tobacco: Never Used   Substance and Sexual Activity     Alcohol use: Yes     Alcohol/week: 0.0 standard drinks     Comment: 1-2 drinks per week     Drug use: No     Sexual activity: Yes     Partners: Male     Birth control/protection: Surgical     Comment: georgie   Lifestyle     Physical activity:     Days per week: Not on file     Minutes per session: Not on file      "Stress: Not on file   Relationships     Social connections:     Talks on phone: Not on file     Gets together: Not on file     Attends [employer] service: Not on file     Active member of club or organization: Not on file     Attends meetings of clubs or organizations: Not on file     Relationship status: Not on file     Intimate partner violence:     Fear of current or ex partner: Not on file     Emotionally abused: Not on file     Physically abused: Not on file     Forced sexual activity: Not on file   Other Topics Concern      Service No     Blood Transfusions No     Caffeine Concern Yes     Comment: 1 diet cokes per day     Occupational Exposure No     Hobby Hazards No     Sleep Concern No     Stress Concern No     Weight Concern Yes     Comment: Working on losing     Special Diet No     Back Care No     Exercise Yes     Comment: Eliptical and weight training - 3 times per week     Bike Helmet Yes     Seat Belt Yes     Self-Exams No     Parent/sibling w/ CABG, MI or angioplasty before 65F 55M? Not Asked   Social History Narrative     Not on file        Family History:  Family History   Problem Relation Age of Onset     Lipids Father      Myocardial Infarction Father 53     Cancer Father 55        small cell lung     Breast Cancer Maternal Grandmother         at age 60, survivor     Skin Cancer Maternal Grandmother         melanoma, age 80's     Breast Cancer Paternal Grandmother         at age 60, survivor     Thyroid Disease Paternal Grandmother      Lipids Paternal Grandmother      Respiratory Paternal Grandmother         COPD,      Alcohol/Drug Paternal Grandfather      Lipids Sister      Gallbladder disease: No    Review of Systems:  The 10 point Review of Systems is negative other than noted in the HPI and above.    Physical Exam:  /68   Pulse 90   Ht 1.702 m (5' 7\")   Wt 81.2 kg (179 lb)   LMP 2019   BMI 28.04 kg/m    General - Well developed, well nourished female in no " apparent distress  HEENT:  Head normocephalic and atraumatic, pupils equal and round, conjunctivae clear, no scleral icterus, mucous membranes moist, external ears and nose normal  Neck: Supple without thyromegaly or masses  Lymphatic: No cervical, or supraclavicular lymphadenopathy  Lungs: normal respiratory effort  Abdomen:   soft, flat, non-distended with no tenderness noted diffusely. no masses palpated  Extremities: Warm without edema  Musculoskeletal:  Normal station and gait  Neurologic: nonfocal  Psychiatric: Mood and affect appropriate  Skin: Without lesions or rashes, or jaundice    Relevant labs:  Liver function panel- normal  WBC- not performed  Lipase- normal    Imaging:  Ultrasound RUQ: positive cholelithiasis, negative gallbladder wall thickening, negative ductal dilatation, negative pericholecystic fluid, negative sonographic Mi's sign.    Assessment and Plan:  It is my impression that Beth has symptomatic gallstones.   I have offered her a laparoscopic cholecystectomy.  We have discussed the indication, risks and expected recovery.      We will schedule surgery at her convenience.    Angel Benjamin MD

## 2020-01-20 ENCOUNTER — TELEPHONE (OUTPATIENT)
Dept: SURGERY | Facility: CLINIC | Age: 45
End: 2020-01-20

## 2020-01-20 NOTE — TELEPHONE ENCOUNTER
Type of surgery: Lap jenna  Location of surgery: [facility]  Date and time of surgery: 1/30/20 at 1:30pm  Surgeon: Dr. Angel Benjamin  Pre-Op Appt Date: Patient to schedule  Post-Op Appt Date: Patient to schedule   Packet sent out: Yes  Pre-cert/Authorization completed:  Not Applicable  Date: 1/20/20

## 2020-01-23 ENCOUNTER — OFFICE VISIT (OUTPATIENT)
Dept: FAMILY MEDICINE | Facility: CLINIC | Age: 45
End: 2020-01-23
Payer: COMMERCIAL

## 2020-01-23 VITALS
BODY MASS INDEX: 27.25 KG/M2 | HEART RATE: 84 BPM | TEMPERATURE: 97.5 F | WEIGHT: 174 LBS | SYSTOLIC BLOOD PRESSURE: 100 MMHG | DIASTOLIC BLOOD PRESSURE: 70 MMHG

## 2020-01-23 DIAGNOSIS — Z01.818 PREOP GENERAL PHYSICAL EXAM: Primary | ICD-10-CM

## 2020-01-23 DIAGNOSIS — K80.20 CALCULUS OF GALLBLADDER WITHOUT CHOLECYSTITIS WITHOUT OBSTRUCTION: ICD-10-CM

## 2020-01-23 LAB
HGB BLD-MCNC: 14 G/DL (ref 11.7–15.7)
PLATELET # BLD AUTO: 299 10E9/L (ref 150–450)

## 2020-01-23 PROCEDURE — 36415 COLL VENOUS BLD VENIPUNCTURE: CPT | Performed by: NURSE PRACTITIONER

## 2020-01-23 PROCEDURE — 99214 OFFICE O/P EST MOD 30 MIN: CPT | Performed by: NURSE PRACTITIONER

## 2020-01-23 PROCEDURE — 85049 AUTOMATED PLATELET COUNT: CPT | Performed by: NURSE PRACTITIONER

## 2020-01-23 PROCEDURE — 85018 HEMOGLOBIN: CPT | Performed by: NURSE PRACTITIONER

## 2020-01-23 ASSESSMENT — ANXIETY QUESTIONNAIRES
2. NOT BEING ABLE TO STOP OR CONTROL WORRYING: NOT AT ALL
6. BECOMING EASILY ANNOYED OR IRRITABLE: NOT AT ALL
5. BEING SO RESTLESS THAT IT IS HARD TO SIT STILL: NOT AT ALL
3. WORRYING TOO MUCH ABOUT DIFFERENT THINGS: NOT AT ALL
7. FEELING AFRAID AS IF SOMETHING AWFUL MIGHT HAPPEN: NOT AT ALL
1. FEELING NERVOUS, ANXIOUS, OR ON EDGE: NOT AT ALL
GAD7 TOTAL SCORE: 0
IF YOU CHECKED OFF ANY PROBLEMS ON THIS QUESTIONNAIRE, HOW DIFFICULT HAVE THESE PROBLEMS MADE IT FOR YOU TO DO YOUR WORK, TAKE CARE OF THINGS AT HOME, OR GET ALONG WITH OTHER PEOPLE: NOT DIFFICULT AT ALL

## 2020-01-23 ASSESSMENT — PATIENT HEALTH QUESTIONNAIRE - PHQ9
5. POOR APPETITE OR OVEREATING: NOT AT ALL
SUM OF ALL RESPONSES TO PHQ QUESTIONS 1-9: 2

## 2020-01-23 NOTE — PROGRESS NOTES
[facility] [place]  [address] [address]  [phone]  Dept: [phone]    PRE-OP EVALUATION:  Today's date: 2020    Nicole Lee Meester (: 1975) presents for pre-operative evaluation assessment as requested by Dr. Benjamin.  She requires evaluation and anesthesia risk assessment prior to undergoing surgery/procedure for treatment of cholelithiasis.    Proposed Surgery/ Procedure: LAPAROSCOPIC CHOLECYSTECTOMY  Date of Surgery/ Procedure: 20  Time of Surgery/ Procedure: 2:30   Hospital/Surgical [facility]    Primary Physician: Mariano Ayala  Type of Anesthesia Anticipated: General    Patient has a Health Care Directive or Living Will:  NO    1. NO - Do you have a history of heart attack, stroke, stent, bypass or surgery on an artery in the head, neck, heart or legs?  2. NO - Do you ever have any pain or discomfort in your chest?  3. NO - Do you have a history of  Heart Failure?  4. NO - Are you troubled by shortness of breath when: walking on the level, up a slight hill or at night?  5. NO - Do you currently have a cold, bronchitis or other respiratory infection?  6. NO - Do you have a cough, shortness of breath or wheezing?  7. NO - Do you sometimes get pains in the calves of your legs when you walk?  8. YES - Do you or anyone in your family have previous history of blood clots? * father with lung cancer  9. NO - Do you or does anyone in your family have a serious bleeding problem such as prolonged bleeding following surgeries or cuts?  10. NO - Have you ever had problems with anemia or been told to take iron pills?  11. NO - Have you had any abnormal blood loss such as black, tarry or bloody stools, or abnormal vaginal bleeding?  12. NO - Have you ever had a blood transfusion?  13. YES - Have you or any of your relatives ever had problems with anesthesia? N* mother has woken up during surgery   14. NO - Do you have sleep apnea, excessive snoring  or daytime drowsiness?  15. NO - Do you have any prosthetic heart valves?  16. NO - Do you have prosthetic joints?  17. NO - Is there any chance that you may be pregnant?      HPI:     HPI related to upcoming procedure: Recently-discovered cholelithiasis after noting post-prandial RUQ pain.      See problem list for active medical problems.  Problems all longstanding and stable, except as noted/documented.  See ROS for pertinent symptoms related to these conditions.      MEDICAL HISTORY:     Patient Active Problem List    Diagnosis Date Noted     Cholelithiasis 2020     Priority: Medium     Added automatically from request for surgery [id number]       Mild single current episode of major depressive disorder (H) 2017     Priority: Medium     Intractable migraine without aura and with status migrainosus 2016     Priority: Medium     CARDIOVASCULAR SCREENING; LDL GOAL LESS THAN 160 2015     Priority: Medium      Past Medical History:   Diagnosis Date     Contraceptive management     [facility] d/c      Hives      Migraine headache     sees neurologist     Mild major depression (H)      Tubal ligation status     [facility]     Past Surgical History:   Procedure Laterality Date     BIOPSY BREAST SEED LOCALIZATION Left 2018    Procedure: SEED LOCALIZED LEFT BREAST MASS EXCISION;  Surgeon: Tam Lester MD;  Location: [facility]     C  DELIVERY ONLY       HC HYSTEROS W PERMANENT FALLOPAIN IMPLANT  4-3-2012    [facility]     LASIK       Current Outpatient Medications   Medication Sig Dispense Refill     topiramate (TOPAMAX) 25 MG capsule Take 4 capsules by mouth At Bedtime. 120 capsule 6     venlafaxine (EFFEXOR-XR) 75 MG 24 hr capsule TAKE ONE CAPSULE BY MOUTH EVERY DAY 90 capsule 0     ketorolac (TORADOL) 30 MG/ML injection Inject 2 mLs into the muscle as needed.       OTC products: None, except as noted above    Allergies   Allergen Reactions     Nkda [No Known Drug Allergies]        Latex Allergy: NO    Social History     Tobacco Use     Smoking status: Never Smoker     Smokeless tobacco: Never Used   Substance Use Topics     Alcohol use: Yes     Alcohol/week: 0.0 standard drinks     Comment: 1-2 drinks per week     History   Drug Use No       REVIEW OF SYSTEMS:   CONSTITUTIONAL: NEGATIVE for fever, chills, change in weight  INTEGUMENTARY/SKIN: NEGATIVE for worrisome rashes, moles or lesions  EYES: NEGATIVE for vision changes or irritation  ENT/MOUTH: NEGATIVE for ear, mouth and throat problems  RESP: NEGATIVE for significant cough or SOB  BREAST: NEGATIVE for masses, tenderness or discharge  CV: NEGATIVE for chest pain, palpitations or peripheral edema  GI: See HPI.  : NEGATIVE for frequency, dysuria, or hematuria  MUSCULOSKELETAL: NEGATIVE for significant arthralgias or myalgia  NEURO: NEGATIVE for weakness, dizziness or paresthesias  ENDOCRINE: NEGATIVE for temperature intolerance, skin/hair changes  HEME: NEGATIVE for bleeding problems  PSYCHIATRIC: NEGATIVE for changes in mood or affect    EXAM:   /70 (BP Location: Left arm, Patient Position: Chair, Cuff Size: Adult Regular)   Pulse 84   Temp 97.5  F (36.4  C) (Tympanic)   Wt 78.9 kg (174 lb)   LMP 12/24/2019   BMI 27.25 kg/m      GENERAL APPEARANCE: healthy, alert and no distress     EYES: EOMI, PERRL     HENT: ear canals and TM's normal and nose and mouth without ulcers or lesions     NECK: no adenopathy, no asymmetry, masses, or scars and thyroid normal to palpation     RESP: lungs clear to auscultation - no rales, rhonchi or wheezes     CV: regular rates and rhythm, normal S1 S2, no S3 or S4 and no murmur, click or rub     ABDOMEN:  soft, nontender, no HSM or masses and bowel sounds normal     MS: extremities normal- no gross deformities noted, no evidence of inflammation in joints, FROM in all extremities.     SKIN: no suspicious lesions or rashes     NEURO: Normal strength and tone, sensory exam grossly normal,  mentation intact and speech normal     PSYCH: mentation appears normal. and affect normal/bright     LYMPHATICS: No cervical adenopathy    DIAGNOSTICS:   EKG: Not indicated due to non-vascular surgery and low risk of event (age <65 and without cardiac risk factors)    Hemoglobin   Date Value Ref Range Status   01/23/2020 14.0 11.7 - 15.7 g/dL Final     Platelet Count   Date Value Ref Range Status   01/23/2020 299 150 - 450 10e9/L Final       Recent Labs   Lab Test 01/13/20  1411 11/01/18  0921 09/21/16  1159 04/22/15  0756   HGB  --  12.8 13.6 14.2   PLT  --   --  282 274   INR  --   --  0.97  --      --   --  137   POTASSIUM 4.5  --   --  4.0   CR 0.98  --   --  0.98     IMPRESSION:   Reason for surgery/procedure: Cholelithiasis  Diagnosis/reason for consult: Preoperative clearance    The proposed surgical procedure is considered INTERMEDIATE risk.    REVISED CARDIAC RISK INDEX  The patient has the following serious cardiovascular risks for perioperative complications such as (MI, PE, VFib and 3  AV Block):  No serious cardiac risks  INTERPRETATION: 0 risks: Class I (very low risk - 0.4% complication rate)    The patient has the following additional risks for perioperative complications:  No identified additional risks      ICD-10-CM    1. Preop general physical exam Z01.818 Hemoglobin     Platelet count   2. Calculus of gallbladder without cholecystitis without obstruction K80.20        RECOMMENDATIONS:     --Patient is to take all scheduled medications on the day of surgery    APPROVAL GIVEN to proceed with proposed procedure, without further diagnostic evaluation       Signed Electronically by: Mariano Ayala NP    Copy of this evaluation report is provided to requesting physician.    [facility] Preop Guidelines    Revised Cardiac Risk Index

## 2020-01-24 ASSESSMENT — ANXIETY QUESTIONNAIRES: GAD7 TOTAL SCORE: 0

## 2020-01-30 ENCOUNTER — ANESTHESIA EVENT (OUTPATIENT)
Dept: SURGERY | Facility: CLINIC | Age: 45
End: 2020-01-30
Payer: COMMERCIAL

## 2020-01-30 ENCOUNTER — HOSPITAL ENCOUNTER (OUTPATIENT)
Facility: CLINIC | Age: 45
Discharge: HOME | End: 2020-01-30
Attending: SURGERY | Admitting: SURGERY
Payer: COMMERCIAL

## 2020-01-30 ENCOUNTER — ANESTHESIA (OUTPATIENT)
Dept: SURGERY | Facility: CLINIC | Age: 45
End: 2020-01-30
Payer: COMMERCIAL

## 2020-01-30 ENCOUNTER — APPOINTMENT (OUTPATIENT)
Dept: SURGERY | Facility: PHYSICIAN GROUP | Age: 45
End: 2020-01-30
Payer: COMMERCIAL

## 2020-01-30 VITALS
SYSTOLIC BLOOD PRESSURE: 103 MMHG | TEMPERATURE: 97.2 F | BODY MASS INDEX: 27.62 KG/M2 | OXYGEN SATURATION: 97 % | HEIGHT: 67 IN | DIASTOLIC BLOOD PRESSURE: 60 MMHG | WEIGHT: 176 LBS | RESPIRATION RATE: 18 BRPM | HEART RATE: 64 BPM

## 2020-01-30 DIAGNOSIS — Z90.49 S/P LAPAROSCOPIC CHOLECYSTECTOMY: Primary | ICD-10-CM

## 2020-01-30 DIAGNOSIS — K80.20 CHOLELITHIASIS: ICD-10-CM

## 2020-01-30 LAB — HCG UR QL: NEGATIVE

## 2020-01-30 PROCEDURE — 71000027 ZZH RECOVERY PHASE 2 EACH 15 MINS: Performed by: SURGERY

## 2020-01-30 PROCEDURE — 36000058 ZZH SURGERY LEVEL 3 EA 15 ADDTL MIN: Performed by: SURGERY

## 2020-01-30 PROCEDURE — 88304 TISSUE EXAM BY PATHOLOGIST: CPT | Performed by: SURGERY

## 2020-01-30 PROCEDURE — 25000125 ZZHC RX 250: Performed by: NURSE ANESTHETIST, CERTIFIED REGISTERED

## 2020-01-30 PROCEDURE — 88304 TISSUE EXAM BY PATHOLOGIST: CPT | Mod: 26 | Performed by: SURGERY

## 2020-01-30 PROCEDURE — 47562 LAPAROSCOPIC CHOLECYSTECTOMY: CPT | Performed by: SURGERY

## 2020-01-30 PROCEDURE — 25000125 ZZHC RX 250: Performed by: SURGERY

## 2020-01-30 PROCEDURE — 25000128 H RX IP 250 OP 636: Performed by: NURSE ANESTHETIST, CERTIFIED REGISTERED

## 2020-01-30 PROCEDURE — 81025 URINE PREGNANCY TEST: CPT | Performed by: ANESTHESIOLOGY

## 2020-01-30 PROCEDURE — 25000128 H RX IP 250 OP 636: Performed by: ANESTHESIOLOGY

## 2020-01-30 PROCEDURE — 36000056 ZZH SURGERY LEVEL 3 1ST 30 MIN: Performed by: SURGERY

## 2020-01-30 PROCEDURE — 71000012 ZZH RECOVERY PHASE 1 LEVEL 1 FIRST HR: Performed by: SURGERY

## 2020-01-30 PROCEDURE — 25800030 ZZH RX IP 258 OP 636: Performed by: ANESTHESIOLOGY

## 2020-01-30 PROCEDURE — 27210794 ZZH OR GENERAL SUPPLY STERILE: Performed by: SURGERY

## 2020-01-30 PROCEDURE — 25000128 H RX IP 250 OP 636: Performed by: SURGERY

## 2020-01-30 PROCEDURE — 25800030 ZZH RX IP 258 OP 636: Performed by: NURSE ANESTHETIST, CERTIFIED REGISTERED

## 2020-01-30 PROCEDURE — 25000132 ZZH RX MED GY IP 250 OP 250 PS 637: Performed by: STUDENT IN AN ORGANIZED HEALTH CARE EDUCATION/TRAINING PROGRAM

## 2020-01-30 PROCEDURE — 71000013 ZZH RECOVERY PHASE 1 LEVEL 1 EA ADDTL HR: Performed by: SURGERY

## 2020-01-30 PROCEDURE — 25000566 ZZH SEVOFLURANE, EA 15 MIN: Performed by: SURGERY

## 2020-01-30 PROCEDURE — 37000008 ZZH ANESTHESIA TECHNICAL FEE, 1ST 30 MIN: Performed by: SURGERY

## 2020-01-30 PROCEDURE — 37000009 ZZH ANESTHESIA TECHNICAL FEE, EACH ADDTL 15 MIN: Performed by: SURGERY

## 2020-01-30 PROCEDURE — 40000170 ZZH STATISTIC PRE-PROCEDURE ASSESSMENT II: Performed by: SURGERY

## 2020-01-30 RX ORDER — ONDANSETRON 4 MG/1
4 TABLET, ORALLY DISINTEGRATING ORAL EVERY 30 MIN PRN
Status: DISCONTINUED | OUTPATIENT
Start: 2020-01-30 | End: 2020-01-30 | Stop reason: HOSPADM

## 2020-01-30 RX ORDER — LIDOCAINE HYDROCHLORIDE 20 MG/ML
INJECTION, SOLUTION INFILTRATION; PERINEURAL PRN
Status: DISCONTINUED | OUTPATIENT
Start: 2020-01-30 | End: 2020-01-30

## 2020-01-30 RX ORDER — LIDOCAINE HYDROCHLORIDE 10 MG/ML
INJECTION, SOLUTION EPIDURAL; INFILTRATION; INTRACAUDAL; PERINEURAL
Status: DISCONTINUED
Start: 2020-01-30 | End: 2020-01-30 | Stop reason: HOSPADM

## 2020-01-30 RX ORDER — PROPOFOL 10 MG/ML
INJECTION, EMULSION INTRAVENOUS PRN
Status: DISCONTINUED | OUTPATIENT
Start: 2020-01-30 | End: 2020-01-30

## 2020-01-30 RX ORDER — GLYCOPYRROLATE 0.2 MG/ML
INJECTION, SOLUTION INTRAMUSCULAR; INTRAVENOUS PRN
Status: DISCONTINUED | OUTPATIENT
Start: 2020-01-30 | End: 2020-01-30

## 2020-01-30 RX ORDER — FENTANYL CITRATE 50 UG/ML
25-50 INJECTION, SOLUTION INTRAMUSCULAR; INTRAVENOUS
Status: DISCONTINUED | OUTPATIENT
Start: 2020-01-30 | End: 2020-01-30 | Stop reason: HOSPADM

## 2020-01-30 RX ORDER — SODIUM CHLORIDE, SODIUM LACTATE, POTASSIUM CHLORIDE, CALCIUM CHLORIDE 600; 310; 30; 20 MG/100ML; MG/100ML; MG/100ML; MG/100ML
INJECTION, SOLUTION INTRAVENOUS CONTINUOUS
Status: DISCONTINUED | OUTPATIENT
Start: 2020-01-30 | End: 2020-01-30 | Stop reason: HOSPADM

## 2020-01-30 RX ORDER — NEOSTIGMINE METHYLSULFATE 1 MG/ML
VIAL (ML) INJECTION PRN
Status: DISCONTINUED | OUTPATIENT
Start: 2020-01-30 | End: 2020-01-30

## 2020-01-30 RX ORDER — FENTANYL CITRATE 50 UG/ML
INJECTION, SOLUTION INTRAMUSCULAR; INTRAVENOUS PRN
Status: DISCONTINUED | OUTPATIENT
Start: 2020-01-30 | End: 2020-01-30

## 2020-01-30 RX ORDER — HYDROMORPHONE HYDROCHLORIDE 1 MG/ML
.3-.5 INJECTION, SOLUTION INTRAMUSCULAR; INTRAVENOUS; SUBCUTANEOUS EVERY 10 MIN PRN
Status: DISCONTINUED | OUTPATIENT
Start: 2020-01-30 | End: 2020-01-30 | Stop reason: HOSPADM

## 2020-01-30 RX ORDER — ONDANSETRON 2 MG/ML
INJECTION INTRAMUSCULAR; INTRAVENOUS PRN
Status: DISCONTINUED | OUTPATIENT
Start: 2020-01-30 | End: 2020-01-30

## 2020-01-30 RX ORDER — PROPOFOL 10 MG/ML
INJECTION, EMULSION INTRAVENOUS CONTINUOUS PRN
Status: DISCONTINUED | OUTPATIENT
Start: 2020-01-30 | End: 2020-01-30

## 2020-01-30 RX ORDER — CEFAZOLIN SODIUM 2 G/100ML
2 INJECTION, SOLUTION INTRAVENOUS
Status: COMPLETED | OUTPATIENT
Start: 2020-01-30 | End: 2020-01-30

## 2020-01-30 RX ORDER — DEXAMETHASONE SODIUM PHOSPHATE 4 MG/ML
INJECTION, SOLUTION INTRA-ARTICULAR; INTRALESIONAL; INTRAMUSCULAR; INTRAVENOUS; SOFT TISSUE PRN
Status: DISCONTINUED | OUTPATIENT
Start: 2020-01-30 | End: 2020-01-30

## 2020-01-30 RX ORDER — BUPIVACAINE HYDROCHLORIDE AND EPINEPHRINE 5; 5 MG/ML; UG/ML
INJECTION, SOLUTION EPIDURAL; INTRACAUDAL; PERINEURAL
Status: DISCONTINUED
Start: 2020-01-30 | End: 2020-01-30 | Stop reason: HOSPADM

## 2020-01-30 RX ORDER — KETOROLAC TROMETHAMINE 30 MG/ML
30 INJECTION, SOLUTION INTRAMUSCULAR; INTRAVENOUS ONCE
Status: DISCONTINUED | OUTPATIENT
Start: 2020-01-30 | End: 2020-01-30 | Stop reason: HOSPADM

## 2020-01-30 RX ORDER — MAGNESIUM HYDROXIDE 1200 MG/15ML
LIQUID ORAL PRN
Status: DISCONTINUED | OUTPATIENT
Start: 2020-01-30 | End: 2020-01-30 | Stop reason: HOSPADM

## 2020-01-30 RX ORDER — SODIUM CHLORIDE, SODIUM LACTATE, POTASSIUM CHLORIDE, CALCIUM CHLORIDE 600; 310; 30; 20 MG/100ML; MG/100ML; MG/100ML; MG/100ML
INJECTION, SOLUTION INTRAVENOUS CONTINUOUS PRN
Status: DISCONTINUED | OUTPATIENT
Start: 2020-01-30 | End: 2020-01-30

## 2020-01-30 RX ORDER — OXYCODONE HYDROCHLORIDE 5 MG/1
5 TABLET ORAL
Status: COMPLETED | OUTPATIENT
Start: 2020-01-30 | End: 2020-01-30

## 2020-01-30 RX ORDER — NALOXONE HYDROCHLORIDE 0.4 MG/ML
.1-.4 INJECTION, SOLUTION INTRAMUSCULAR; INTRAVENOUS; SUBCUTANEOUS
Status: DISCONTINUED | OUTPATIENT
Start: 2020-01-30 | End: 2020-01-30 | Stop reason: HOSPADM

## 2020-01-30 RX ORDER — ONDANSETRON 2 MG/ML
4 INJECTION INTRAMUSCULAR; INTRAVENOUS EVERY 30 MIN PRN
Status: DISCONTINUED | OUTPATIENT
Start: 2020-01-30 | End: 2020-01-30 | Stop reason: HOSPADM

## 2020-01-30 RX ORDER — EPHEDRINE SULFATE 50 MG/ML
INJECTION, SOLUTION INTRAMUSCULAR; INTRAVENOUS; SUBCUTANEOUS PRN
Status: DISCONTINUED | OUTPATIENT
Start: 2020-01-30 | End: 2020-01-30

## 2020-01-30 RX ORDER — ACETAMINOPHEN 325 MG/1
650 TABLET ORAL
Status: DISCONTINUED | OUTPATIENT
Start: 2020-01-30 | End: 2020-01-30 | Stop reason: HOSPADM

## 2020-01-30 RX ORDER — OXYCODONE HYDROCHLORIDE 5 MG/1
5-10 TABLET ORAL EVERY 4 HOURS PRN
Qty: 10 TABLET | Refills: 0 | Status: SHIPPED | OUTPATIENT
Start: 2020-01-30 | End: 2020-01-31

## 2020-01-30 RX ORDER — AMOXICILLIN 250 MG
1-2 CAPSULE ORAL 2 TIMES DAILY PRN
Qty: 30 TABLET | Refills: 0 | Status: SHIPPED | OUTPATIENT
Start: 2020-01-30 | End: 2021-02-18

## 2020-01-30 RX ORDER — MEPERIDINE HYDROCHLORIDE 25 MG/ML
12.5 INJECTION INTRAMUSCULAR; INTRAVENOUS; SUBCUTANEOUS
Status: DISCONTINUED | OUTPATIENT
Start: 2020-01-30 | End: 2020-01-30 | Stop reason: HOSPADM

## 2020-01-30 RX ORDER — LIDOCAINE 40 MG/G
CREAM TOPICAL
Status: DISCONTINUED | OUTPATIENT
Start: 2020-01-30 | End: 2020-01-30 | Stop reason: HOSPADM

## 2020-01-30 RX ORDER — CEFAZOLIN SODIUM 1 G/3ML
1 INJECTION, POWDER, FOR SOLUTION INTRAMUSCULAR; INTRAVENOUS SEE ADMIN INSTRUCTIONS
Status: DISCONTINUED | OUTPATIENT
Start: 2020-01-30 | End: 2020-01-30 | Stop reason: HOSPADM

## 2020-01-30 RX ADMIN — Medication 5 MG: at 14:55

## 2020-01-30 RX ADMIN — DEXMEDETOMIDINE HYDROCHLORIDE 12 MCG: 100 INJECTION, SOLUTION INTRAVENOUS at 14:42

## 2020-01-30 RX ADMIN — MIDAZOLAM 2 MG: 1 INJECTION INTRAMUSCULAR; INTRAVENOUS at 14:40

## 2020-01-30 RX ADMIN — CEFAZOLIN SODIUM 2 G: 2 INJECTION, SOLUTION INTRAVENOUS at 14:47

## 2020-01-30 RX ADMIN — ROCURONIUM BROMIDE 50 MG: 10 INJECTION INTRAVENOUS at 14:43

## 2020-01-30 RX ADMIN — FENTANYL CITRATE 50 MCG: 0.05 INJECTION, SOLUTION INTRAMUSCULAR; INTRAVENOUS at 16:09

## 2020-01-30 RX ADMIN — SODIUM CHLORIDE, POTASSIUM CHLORIDE, SODIUM LACTATE AND CALCIUM CHLORIDE: 600; 310; 30; 20 INJECTION, SOLUTION INTRAVENOUS at 16:53

## 2020-01-30 RX ADMIN — ONDANSETRON 4 MG: 2 INJECTION INTRAMUSCULAR; INTRAVENOUS at 15:14

## 2020-01-30 RX ADMIN — FENTANYL CITRATE 50 MCG: 50 INJECTION, SOLUTION INTRAMUSCULAR; INTRAVENOUS at 14:56

## 2020-01-30 RX ADMIN — GLYCOPYRROLATE 0.6 MG: 0.2 INJECTION, SOLUTION INTRAMUSCULAR; INTRAVENOUS at 15:22

## 2020-01-30 RX ADMIN — HYDROMORPHONE HYDROCHLORIDE 0.5 MG: 1 INJECTION, SOLUTION INTRAMUSCULAR; INTRAVENOUS; SUBCUTANEOUS at 16:29

## 2020-01-30 RX ADMIN — PROPOFOL 50 MCG/KG/MIN: 10 INJECTION, EMULSION INTRAVENOUS at 14:43

## 2020-01-30 RX ADMIN — PROPOFOL 200 MG: 10 INJECTION, EMULSION INTRAVENOUS at 14:42

## 2020-01-30 RX ADMIN — FENTANYL CITRATE 50 MCG: 0.05 INJECTION, SOLUTION INTRAMUSCULAR; INTRAVENOUS at 16:17

## 2020-01-30 RX ADMIN — DEXMEDETOMIDINE HYDROCHLORIDE 8 MCG: 100 INJECTION, SOLUTION INTRAVENOUS at 14:57

## 2020-01-30 RX ADMIN — DEXAMETHASONE SODIUM PHOSPHATE 4 MG: 4 INJECTION, SOLUTION INTRA-ARTICULAR; INTRALESIONAL; INTRAMUSCULAR; INTRAVENOUS; SOFT TISSUE at 14:50

## 2020-01-30 RX ADMIN — HYDROMORPHONE HYDROCHLORIDE 0.5 MG: 1 INJECTION, SOLUTION INTRAMUSCULAR; INTRAVENOUS; SUBCUTANEOUS at 16:53

## 2020-01-30 RX ADMIN — FENTANYL CITRATE 25 MCG: 50 INJECTION, SOLUTION INTRAMUSCULAR; INTRAVENOUS at 15:27

## 2020-01-30 RX ADMIN — LIDOCAINE HYDROCHLORIDE 80 MG: 20 INJECTION, SOLUTION INFILTRATION; PERINEURAL at 14:42

## 2020-01-30 RX ADMIN — NEOSTIGMINE METHYLSULFATE 4 MG: 1 INJECTION, SOLUTION INTRAVENOUS at 15:23

## 2020-01-30 RX ADMIN — OXYCODONE HYDROCHLORIDE 5 MG: 5 TABLET ORAL at 17:00

## 2020-01-30 RX ADMIN — SODIUM CHLORIDE, POTASSIUM CHLORIDE, SODIUM LACTATE AND CALCIUM CHLORIDE: 600; 310; 30; 20 INJECTION, SOLUTION INTRAVENOUS at 14:40

## 2020-01-30 RX ADMIN — FENTANYL CITRATE 50 MCG: 50 INJECTION, SOLUTION INTRAMUSCULAR; INTRAVENOUS at 15:44

## 2020-01-30 RX ADMIN — FENTANYL CITRATE 50 MCG: 50 INJECTION, SOLUTION INTRAMUSCULAR; INTRAVENOUS at 14:40

## 2020-01-30 RX ADMIN — FENTANYL CITRATE 25 MCG: 50 INJECTION, SOLUTION INTRAMUSCULAR; INTRAVENOUS at 15:20

## 2020-01-30 ASSESSMENT — MIFFLIN-ST. JEOR: SCORE: 1485.96

## 2020-01-30 ASSESSMENT — LIFESTYLE VARIABLES: TOBACCO_USE: 0

## 2020-01-30 ASSESSMENT — ENCOUNTER SYMPTOMS: SEIZURES: 0

## 2020-01-30 NOTE — ANESTHESIA PREPROCEDURE EVALUATION
Anesthesia Pre-Procedure Evaluation    Patient: Nicole Lee Meester   MRN: [id number] : 1975          Preoperative Diagnosis: Cholelithiasis [K80.20]    Procedure(s):  LAPAROSCOPIC CHOLECYSTECTOMY    Past Medical History:   Diagnosis Date     Contraceptive management     [facility] d/c      Hives      Migraine headache     sees neurologist     Mild major depression (H)      Tubal ligation status     [facility]     Past Surgical History:   Procedure Laterality Date     BIOPSY BREAST SEED LOCALIZATION Left 2018    Procedure: SEED LOCALIZED LEFT BREAST MASS EXCISION;  Surgeon: Tam Lester MD;  [facility]     C  DELIVERY ONLY       HC HYSTEROS W PERMANENT FALLOPAIN IMPLANT  4-3-2012    [facility]     LASIK         Anesthesia Evaluation     . Pt has had prior anesthetic.     No history of anesthetic complications          ROS/MED HX    ENT/Pulmonary:      (-) tobacco use and sleep apnea   Neurologic:     (+)migraines,    (-) seizures   Cardiovascular:        (-) GARCIA   METS/Exercise Tolerance:  >4 METS   Hematologic:         Musculoskeletal:         GI/Hepatic:     (+) cholecystitis/cholelithiasis,      (-) GERD and liver disease   Renal/Genitourinary:      (-) renal disease   Endo:      (-) Type II DM and thyroid disease   Psychiatric:     (+) psychiatric history depression      Infectious Disease:         Malignancy:         Other:                          Physical Exam  Normal systems: cardiovascular, pulmonary and dental    Airway   Mallampati: II  TM distance: >3 FB  Neck ROM: full    Dental     Cardiovascular       Pulmonary             Lab Results   Component Value Date    WBC 6.4 2016    HGB 14.0 2020    HCT 42.3 2016     2020     2020    POTASSIUM 4.5 2020    CHLORIDE 107 2020    CO2 25 2020    BUN 16 2020    CR 0.98 2020    GLC 86 2020    MARK 8.8 2020    ALBUMIN 3.9 2020    PROTTOTAL 7.3  "01/13/2020    ALT 14 01/13/2020    AST 12 01/13/2020    ALKPHOS 79 01/13/2020    BILITOTAL 0.2 01/13/2020    LIPASE 156 01/13/2020    INR 0.97 09/21/2016    TSH 1.82 11/01/2018    T4 0.97 10/11/2010    HCG Negative 01/30/2020       Preop Vitals  BP Readings from Last 3 Encounters:   01/30/20 109/56   01/23/20 100/70   01/15/20 112/68    Pulse Readings from Last 3 Encounters:   01/23/20 84   01/15/20 90   01/13/20 72      Resp Readings from Last 3 Encounters:   01/30/20 16   12/05/18 12   09/21/16 14    SpO2 Readings from Last 3 Encounters:   01/30/20 100%   08/08/19 99%   08/01/19 98%      Temp Readings from Last 1 Encounters:   01/30/20 35.6  C (96.1  F) (Oral)    Ht Readings from Last 1 Encounters:   01/30/20 1.702 m (5' 7\")      Wt Readings from Last 1 Encounters:   01/30/20 79.8 kg (176 lb)    Estimated body mass index is 27.57 kg/m  as calculated from the following:    Height as of this encounter: 1.702 m (5' 7\").    Weight as of this encounter: 79.8 kg (176 lb).       Anesthesia Plan      History & Physical Review  History and physical reviewed and following examination; no interval change.    ASA Status:  2 .    NPO Status:  > 8 hours    Plan for General and ETT with Intravenous induction. Maintenance will be Balanced.    PONV prophylaxis:  Ondansetron (or other 5HT-3) and Dexamethasone or Solumedrol       Postoperative Care  Postoperative pain management:  Multi-modal analgesia.      Consents  Anesthetic plan, risks, benefits and alternatives discussed with:  Patient and Spouse..                 Ollie Arriaga MD  "

## 2020-01-30 NOTE — BRIEF OP NOTE
[facility]    Brief Operative Note    Pre-operative diagnosis: Cholelithiasis [K80.20]  Post-operative diagnosis Same as pre-operative diagnosis    Procedure: Procedure(s):  LAPAROSCOPIC CHOLECYSTECTOMY  Surgeon: Surgeon(s) and Role:     * Angel Benjamin MD - Primary     * Preston Shahid MD - Resident - Assisting  Anesthesia: General   Estimated blood loss: 5 mL  Drains: None  Specimens:   ID Type Source Tests Collected by Time Destination   A : gallbladder and contents Tissue Gallbladder and Contents SURGICAL PATHOLOGY EXAM Angel Benjamin MD 1/30/2020  3:13 PM      Findings:   gallbladder removed without spillage..  Complications: None.  Implants: * No implants in log *    Preston Shahid MD  General Surgery PGY-4  Pager: [phone]

## 2020-01-30 NOTE — DISCHARGE INSTRUCTIONS
Today you received Toradol, an antiinflammatory medication similar to Ibuprofen.  You should not take other antiinflammatory medication, such as Ibuprofen, Motrin, Advil, Aleve, Naprosyn, etc until 1115pm.         [facility]  Discharge Instructions: Post-Operative Laparoscopic Cholecystectomy    ACTIVITY    Expect to feel tired after your surgery.  This will gradually resolve.      Take frequent, short walks and increase your activity gradually.      Avoid strenuous physical activity or heavy lifting greater than 15-20 lbs. for 2-3 weeks.  You may climb stairs.    You may drive without restrictions when you are not using any prescription pain medication and feel comfortable in a car.    You may return to work/school when you are comfortable without any prescription pain medication.    WOUND CARE    You may remove your outer dressing or Band-Aids and shower 48 hours after the surgery.  Pat your incisions dry and leave them open to air.  Re-apply dressing (Band-Aids or gauze/tape) as needed for comfort or drainage.    You may have steri-strips (looks like white tape) on your incision.  You may peel off the steri-strips 2 weeks after your surgery if they have not peeled off on their own.     Do not soak your incisions in a tub or pool for 2 weeks.     Do not apply any lotions, creams, or ointments to your incisions.    A ridge under your incisions is normal and will gradually resolve.    DIET    Start with liquids, then gradually resume your regular diet as tolerated.  Avoid heavy, spicy, and greasy meals for 2-3 days.    Drink plenty of fluids to stay hydrated.    It is not uncommon to experience some loose stools or diarrhea after surgery.  This is your body s way of adapting to the bile which will slowly drain into your intestine.  A low fat diet may help with this.  This should improve over 1-2 months.    PAIN    Expect some tenderness and discomfort at the incision sites.   Use the prescribed pain medication at your discretion.  Expect gradual resolution of your pain over several days.    You may take ibuprofen with food (unless you have been told not to) instead of or in addition to your prescribed pain medication.  If you are taking Norco or Percocet, do not take any additional acetaminophen/APAP/Tylenol.    Do not drink alcohol or drive while you are taking pain medications.    You may apply ice to your incisions in 20 minute intervals as needed for the next 48 hours.  After that time, consider switching to heat if you prefer.    EXPECTATIONS    Pain medications can cause constipation.  Limit use when possible.  Take over the counter stool softener/stimulant, such as Colace or Senna, 1-2 times a day with plenty of water.  You may take a mild over the counter laxative, such as Miralax or a suppository, as needed.  You may discontinue these medications once you are having regular bowel movements and/or are no longer taking your narcotic pain medication.      You may have shoulder or upper back discomfort due to the gas used in surgery.  This is temporary and should resolve in 48-72 hours.  Short, frequent walks may help with this.    FOLLOW UP    Our office will contact you approximately 2 weeks to check on your progress and answer any questions you may have.  If you are doing well, you will not need to return for a follow up appointment.  If any concerns are identified over the phone, we will help you make an appointment to see a provider.     If you have not received a phone call, have any questions or concerns, or would like to be seen, please call us at [phone] and ask to speak with our nurse.  We are located at [address] [place] [address].    CALL OUR OFFICE [phone] IF YOU HAVE:     Chills or fever above 101 F.    Increased redness, warmth, or drainage at your incisions.    Significant bleeding.    Pain not relieved by your pain medication or  rest.    Increasing pain after the first 48 hours.    Any other concerns or questions.    Revised January 2018    Same Day Surgery Discharge Instructions for  Sedation and General Anesthesia       It's not unusual to feel dizzy, light-headed or faint for up to 24 hours after surgery or while taking pain medication.  If you have these symptoms: sit for a few minutes before standing and have someone assist you when you get up to walk or use the bathroom.      You should rest and relax for the next 24 hours. We recommend you make arrangements to have an adult stay with you for at least 24 hours after your discharge.  Avoid hazardous and strenuous activity.      DO NOT DRIVE any vehicle or operate mechanical equipment for 24 hours following the end of your surgery.  Even though you may feel normal, your reactions may be affected by the medication you have received.      Do not drink alcoholic beverages for 24 hours following surgery.       Slowly progress to your regular diet as you feel able. It's not unusual to feel nauseated and/or vomit after receiving anesthesia.  If you develop these symptoms, drink clear liquids (apple juice, ginger ale, broth, 7-up, etc. ) until you feel better.  If your nausea and vomiting persists for 24 hours, please notify your surgeon.        All narcotic pain medications, along with inactivity and anesthesia, can cause constipation. Drinking plenty of liquids and increasing fiber intake will help.      For any questions of a medical nature, call your surgeon.      Do not make important decisions for 24 hours.      If you had general anesthesia, you may have a sore throat for a couple of days related to the breathing tube used during surgery.  You may use Cepacol lozenges to help with this discomfort.  If it worsens or if you develop a fever, contact your surgeon.       If you feel your pain is not well managed with the pain medications prescribed by your surgeon, please contact your  surgeon's office to let them know so they can address your concerns.       **If you have questions or concerns about your procedure,   call Dr Benjamin at  [phone]**

## 2020-01-31 DIAGNOSIS — Z90.49 S/P LAPAROSCOPIC CHOLECYSTECTOMY: ICD-10-CM

## 2020-01-31 LAB — COPATH REPORT: NORMAL

## 2020-01-31 RX ORDER — OXYCODONE HYDROCHLORIDE 5 MG/1
5 TABLET ORAL EVERY 6 HOURS PRN
Qty: 10 TABLET | Refills: 0 | Status: SHIPPED | OUTPATIENT
Start: 2020-01-31 | End: 2021-02-18

## 2020-01-31 NOTE — OR NURSING
Pt having a lot of pain.  Already put a call into Dr Benjamin's office to speak with someone.  Pt states cramping pain under rib.  Pain medication helps some but only takes edge off.

## 2020-01-31 NOTE — ANESTHESIA POSTPROCEDURE EVALUATION
Patient: Nicole Lee Meester    Procedure(s):  LAPAROSCOPIC CHOLECYSTECTOMY    Diagnosis:Cholelithiasis [K80.20]  Diagnosis Additional Information: No value filed.    Anesthesia Type:  General, ETT    Note:  Anesthesia Post Evaluation    Patient location during evaluation: PACU  Patient participation: Able to fully participate in evaluation  Level of consciousness: awake and alert  Pain management: satisfactory to patient  Airway patency: patent  Cardiovascular status: hemodynamically stable  Respiratory status: acceptable and unassisted  Hydration status: balanced  PONV: none     Anesthetic complications: None          Last vitals:  Vitals:    01/30/20 1645 01/30/20 1700 01/30/20 1730   BP: 106/59 107/65 103/60   Pulse: 67 84 64   Resp: 18 18    Temp: 36.2  C (97.2  F) 36.2  C (97.2  F)    SpO2: 100% 98% 97%         Electronically Signed By: Ollie Arriaga MD  January 31, 2020  7:30 AM

## 2020-01-31 NOTE — TELEPHONE ENCOUNTER
Procedure: Laparoscopic Cholecystectomy  Date: 01/30/2020  Surgeon: Sourav    Patient calling requesting more oxycodone for post surgical pain.  She is unable to take ibuprofen d/t GI upset, but has been alternating oxy with ES Tylenol.  She was given #10 at discharge.    She has not had a fever.  She is urinating without issue, but has not had a bowel movement yet. She is utilizing the senna.  Informed her that she can take up to 4 daily.  She verbalized understanding.    She has no further concerns, she worried about running out of pain medications over the weekend.    Oxycodone 5 mg rx sent to pharmacy of choice.    She knows to continue to monitor symptoms and to call prn with any questions or concerns.    Lori Alvarez, RN-BSN

## 2020-02-02 NOTE — OP NOTE
.General Surgery Operative Note    PREOPERATIVE DIAGNOSIS:  Cholelithiasis [K80.20], cholecystitis    POSTOPERATIVE DIAGNOSIS:  Same    PROCEDURE:   Procedure(s):  LAPAROSCOPIC CHOLECYSTECTOMY    ANESTHESIA:  General.      SURGEON:  Angel Benjamin MD    ASSISTANT:  Preston Shahid MD. The physician was medically necessary for their expertise in camera management, suctioning, and retraction    INDICATIONS:  The patient has gallstones and upper abdominal pain. The risks, including but not limited to bleeding, infection, bile duct or bowel injury, anesthesia, and the possible need for an open approach were reviewed. The patient appeared to understand and wished to proceed with operation.    PROCEDURE:  The patient was taken to the operating suite.  The operative area was prepped and draped in a sterile fashion.  Surgeon initiated timeout was acknowledged.      Under general anesthesia the abdomen was insufflated through a periumbilical incision with a veress needle. Over 3 liters were place with low pressures. A 5mm trocar was placed. There was no injury seen when the camera was placed. Under direct vision a 5mm trocar was placed in the right upper quadrant and an 11mm trocar placed below the xiphoid. The gallbladder was grasped and adhesions taken down with blunt dissection and cautery. The peritoneal surfaces of the critical angle were cauterized posteriorly and anteriorly. The critical angle was dissected out, until there were only two structures remaining. Once these structures were identified, the duct was doubly clipped proximally, singly clipped distally and divided. The cystic artery was double clipped proximally, singly clipped distally and divided. The gallbladder was dissected off its bed with cautery from medial to lateral. The gallbladder was set aside and hemostasis assured on the liver. Irrigation was used and suctioned out. The bed was dry and the clips were intact. The gallbladder was removed through the larger  incision, which was enlarged as needed to permit passage of the gallbladder. We then irrigated again, and saw no sign of blood of bile leak. We checked for veress needle injury, there was none. The large trocar was removed and the fascia closed with 0 Vicryl. Marcaine was instilled. Gas was suctioned out. Trocars were removed. Sponge count was reported as correct. All incisions were closed with 4-0 Vicryl and steri-strips.            INTRAOPERATIVE FINDINGS:  Inflamed gallbladder    Angel Benjamin MD

## 2020-02-03 ENCOUNTER — TELEPHONE (OUTPATIENT)
Dept: SURGERY | Facility: CLINIC | Age: 45
End: 2020-02-03

## 2020-02-03 NOTE — TELEPHONE ENCOUNTER
Called patient at surgeon request.  Updated on path report as requested. Pt denies further questions or needs.  Leigh Juan RN on 2/3/2020 at 9:16 AM

## 2020-02-10 ENCOUNTER — HEALTH MAINTENANCE LETTER (OUTPATIENT)
Age: 45
End: 2020-02-10

## 2020-02-12 ENCOUNTER — TELEPHONE (OUTPATIENT)
Dept: SURGERY | Facility: CLINIC | Age: 45
End: 2020-02-12

## 2020-02-12 NOTE — TELEPHONE ENCOUNTER
[facility]  Post op call note  February 12, 2020     Nicole Lee Meester underwent laparoscopic cholecystectomy on 1/30 with Dr. Benjamin. She has been doing well postoperatively and does not have any questions or concerns. Afebrile and incisions healing without drainage or surrounding erythema. Tolerating PO without nausea and has had regular bowel function. Has gradually resumed normal activity without issue, back at work. Patient was encouraged to call if any new issues arise. Pathology was discussed with the patient.     Preston Shahid MD  [facility]  [phone]

## 2020-07-05 ENCOUNTER — MYC REFILL (OUTPATIENT)
Dept: FAMILY MEDICINE | Facility: CLINIC | Age: 45
End: 2020-07-05

## 2020-07-05 DIAGNOSIS — F32.0 MILD SINGLE CURRENT EPISODE OF MAJOR DEPRESSIVE DISORDER (H): ICD-10-CM

## 2020-07-06 RX ORDER — VENLAFAXINE HYDROCHLORIDE 75 MG/1
75 CAPSULE, EXTENDED RELEASE ORAL DAILY
Qty: 90 CAPSULE | Refills: 0 | Status: SHIPPED | OUTPATIENT
Start: 2020-07-06

## 2020-07-06 NOTE — TELEPHONE ENCOUNTER
Prescription approved per [facility] Refill Protocol.    Merary Comer RN, BSN  [facility]

## 2020-07-16 ENCOUNTER — HOSPITAL ENCOUNTER (OUTPATIENT)
Dept: MAMMOGRAPHY | Facility: CLINIC | Age: 45
Discharge: HOME | End: 2020-07-16
Attending: NURSE PRACTITIONER | Admitting: NURSE PRACTITIONER
Payer: COMMERCIAL

## 2020-07-16 DIAGNOSIS — Z12.31 VISIT FOR SCREENING MAMMOGRAM: ICD-10-CM

## 2020-07-16 PROCEDURE — 77067 SCR MAMMO BI INCL CAD: CPT

## 2020-11-16 ENCOUNTER — HEALTH MAINTENANCE LETTER (OUTPATIENT)
Age: 45
End: 2020-11-16

## 2021-02-18 ENCOUNTER — OFFICE VISIT (OUTPATIENT)
Dept: FAMILY MEDICINE | Facility: CLINIC | Age: 46
End: 2021-02-18
Payer: COMMERCIAL

## 2021-02-18 VITALS
OXYGEN SATURATION: 100 % | BODY MASS INDEX: 31.58 KG/M2 | HEART RATE: 79 BPM | RESPIRATION RATE: 14 BRPM | DIASTOLIC BLOOD PRESSURE: 80 MMHG | SYSTOLIC BLOOD PRESSURE: 122 MMHG | TEMPERATURE: 97.1 F | HEIGHT: 67 IN | WEIGHT: 201.2 LBS

## 2021-02-18 DIAGNOSIS — F32.0 MILD SINGLE CURRENT EPISODE OF MAJOR DEPRESSIVE DISORDER (H): ICD-10-CM

## 2021-02-18 DIAGNOSIS — R19.4 BOWEL HABIT CHANGES: ICD-10-CM

## 2021-02-18 DIAGNOSIS — R19.7 DIARRHEA, UNSPECIFIED TYPE: ICD-10-CM

## 2021-02-18 DIAGNOSIS — L29.9 PRURITIC DISORDER: Primary | ICD-10-CM

## 2021-02-18 DIAGNOSIS — E61.1 IRON DEFICIENCY: ICD-10-CM

## 2021-02-18 LAB
BASOPHILS # BLD AUTO: 0.1 10E9/L (ref 0–0.2)
BASOPHILS NFR BLD AUTO: 0.7 %
DIFFERENTIAL METHOD BLD: ABNORMAL
EOSINOPHIL # BLD AUTO: 0.2 10E9/L (ref 0–0.7)
EOSINOPHIL NFR BLD AUTO: 2.9 %
ERYTHROCYTE [DISTWIDTH] IN BLOOD BY AUTOMATED COUNT: 17 % (ref 10–15)
ERYTHROCYTE [SEDIMENTATION RATE] IN BLOOD BY WESTERGREN METHOD: 7 MM/H (ref 0–20)
HCT VFR BLD AUTO: 38.2 % (ref 35–47)
HGB BLD-MCNC: 12.2 G/DL (ref 11.7–15.7)
LYMPHOCYTES # BLD AUTO: 2.1 10E9/L (ref 0.8–5.3)
LYMPHOCYTES NFR BLD AUTO: 30.6 %
MCH RBC QN AUTO: 25.3 PG (ref 26.5–33)
MCHC RBC AUTO-ENTMCNC: 31.9 G/DL (ref 31.5–36.5)
MCV RBC AUTO: 79 FL (ref 78–100)
MONOCYTES # BLD AUTO: 0.6 10E9/L (ref 0–1.3)
MONOCYTES NFR BLD AUTO: 8.6 %
NEUTROPHILS # BLD AUTO: 4 10E9/L (ref 1.6–8.3)
NEUTROPHILS NFR BLD AUTO: 57.2 %
PLATELET # BLD AUTO: 350 10E9/L (ref 150–450)
RBC # BLD AUTO: 4.82 10E12/L (ref 3.8–5.2)
WBC # BLD AUTO: 7 10E9/L (ref 4–11)

## 2021-02-18 PROCEDURE — 83550 IRON BINDING TEST: CPT | Performed by: NURSE PRACTITIONER

## 2021-02-18 PROCEDURE — 80050 GENERAL HEALTH PANEL: CPT | Performed by: NURSE PRACTITIONER

## 2021-02-18 PROCEDURE — 36415 COLL VENOUS BLD VENIPUNCTURE: CPT | Performed by: NURSE PRACTITIONER

## 2021-02-18 PROCEDURE — 85652 RBC SED RATE AUTOMATED: CPT | Performed by: NURSE PRACTITIONER

## 2021-02-18 PROCEDURE — 99214 OFFICE O/P EST MOD 30 MIN: CPT | Performed by: NURSE PRACTITIONER

## 2021-02-18 PROCEDURE — 82728 ASSAY OF FERRITIN: CPT | Performed by: NURSE PRACTITIONER

## 2021-02-18 PROCEDURE — 86140 C-REACTIVE PROTEIN: CPT | Performed by: NURSE PRACTITIONER

## 2021-02-18 PROCEDURE — 83540 ASSAY OF IRON: CPT | Performed by: NURSE PRACTITIONER

## 2021-02-18 RX ORDER — PREDNISONE 20 MG/1
TABLET ORAL
Qty: 13 TABLET | Refills: 0 | Status: SHIPPED | OUTPATIENT
Start: 2021-02-18

## 2021-02-18 ASSESSMENT — PATIENT HEALTH QUESTIONNAIRE - PHQ9
5. POOR APPETITE OR OVEREATING: NOT AT ALL
SUM OF ALL RESPONSES TO PHQ QUESTIONS 1-9: 2

## 2021-02-18 ASSESSMENT — ANXIETY QUESTIONNAIRES
5. BEING SO RESTLESS THAT IT IS HARD TO SIT STILL: NOT AT ALL
6. BECOMING EASILY ANNOYED OR IRRITABLE: SEVERAL DAYS
IF YOU CHECKED OFF ANY PROBLEMS ON THIS QUESTIONNAIRE, HOW DIFFICULT HAVE THESE PROBLEMS MADE IT FOR YOU TO DO YOUR WORK, TAKE CARE OF THINGS AT HOME, OR GET ALONG WITH OTHER PEOPLE: NOT DIFFICULT AT ALL
3. WORRYING TOO MUCH ABOUT DIFFERENT THINGS: NOT AT ALL
1. FEELING NERVOUS, ANXIOUS, OR ON EDGE: NOT AT ALL
2. NOT BEING ABLE TO STOP OR CONTROL WORRYING: NOT AT ALL

## 2021-02-18 ASSESSMENT — MIFFLIN-ST. JEOR: SCORE: 1595.27

## 2021-02-18 NOTE — PATIENT INSTRUCTIONS
1. Benadryl as tolerated  2. Prednisone taper  3. Keep using triamcinolone and calamine  4. Zyrtec in the morning and Claritin at night    1. Food diary to look for triggers  2. Insoluble fiber (bulk-forming)  3. Consider probiotic  4. Can use Imodium  5. Follow up in a few weeks. If no better, let's order colonoscopy.

## 2021-02-18 NOTE — PROGRESS NOTES
"    Assessment & Plan     Pruritic disorder  Comment: Etiology not evident at this point. Not likely allergic or contact dermatitis (no rash). Will check CMP, CBC, TSH, and inflammatory markers to rule out hematologic issue, infection, inflammation, thyroid disease, renal issue, hepatic issue.   - Comprehensive metabolic panel  - TSH with free T4 reflex  - ESR: Erythrocyte sedimentation rate  - CRP, inflammation  - CBC with platelets and differential  - predniSONE (DELTASONE) 20 MG tablet  Dispense: 13 tablet; Refill: 0    Diarrhea, unspecified type  Comment: Unclear if this is related to underlying pathology causing the pruritis. Will look for clues in ordered lab workup. Unlikely to be infectious in origin (happens only after meals and not after every meal; no fever / chills / cramping / abdominal pain). Will have her use Imodium prn, keep a food diary (possible that this is related to consumption of foods incompatible with absent gall bladder status), try probiotic, and add insoluble bulk-forming fiber to diet. Will follow up in 3-4 weeks and colonoscopy can be considered given change in bowel habit without obvious reason.   - Comprehensive metabolic panel  - TSH with free T4 reflex  - ESR: Erythrocyte sedimentation rate  - CRP, inflammation  - CBC with platelets and differential    Mild single current episode of major depressive disorder (H)  Comment: Stable.         BMI:   Estimated body mass index is 31.51 kg/m  as calculated from the following:    Height as of this encounter: 1.702 m (5' 7\").    Weight as of this encounter: 91.3 kg (201 lb 3.2 oz).   Weight management plan: Discussed healthy diet and exercise guidelines    See Patient Instructions    Return in about 12 days (around 3/2/2021) for persistent or worsening symptoms.    Mariano Ayala NP  [facility] JAMIA Campos is a 46 year old who presents for the following health issues     HPI " "      Diarrhea  Onset/Duration: on and off one month  Description:       Consistency of stool: watery, loose and explosive       Blood in stool: no       Number of loose stools past 24 hours: 1  Progression of Symptoms: same  Accompanying signs and symptoms:       Fever: no       Nausea/Vomiting: no       Abdominal pain: no       Weight loss: no       Episodes of constipation: no  History   Ill contacts: no  Recent use of antibiotics: no  Recent travels: no  Recent medication-new or changes(Rx or OTC): no  Precipitating or alleviating factors: None  Therapies tried and outcome: nothing    Rash  Onset/Duration: one year ago, on and off. Recently progressing  Description  Location: Legs, arms and stomach  Character: blotchy, red  Itching: severe  Intensity:  moderate  Progression of Symptoms:  worsening  Accompanying signs and symptoms:   Fever: no  Body aches or joint pain: no  Sore throat symptoms: no  Recent cold symptoms: no  History:           Previous episodes of similar rash:  3 years ago just on the legs  New exposures:  None  Recent travel: no  Exposure to similar rash: no  Precipitating or alleviating factors: Gets worse  At night after a shower  Therapies tried and outcome: calamine lotion and Benadryl/diphenhydramine and Triamcinolone. Seems to help temporarily with itching.    HPI: Beth presents today for two issues.     1. Diarrhea on and off for the past month. No explanation / obvious triggers. Usually after meals. No dietary changes recently. Had cholecystectomy last year and hasn't changed diet much. Doesn't happen after every meal (but it happens at least once per day). Stool is loose and watery. No cramping or pain but movement is dramatic / \"loud\". No obvious masses. No unintentional weight loss. No fever or chills. Does have some night sweats but she feels she is joao-menopausal. No urinary symptoms. No known thyroid disease.     2. Pruritis. She has struggled with itchy skin (in the absence of " "obvious rash) on and off for the past year. Started getting really bad a couple weeks ago. Started over legs and has now moved over arms and trunk. Upper back is latest site. No insect exposures. No new hygiene products, clothes, sheets, soaps, laundry detergent, medications, foods. No respiratory symptoms (sneeze, cough, wheeze, SOB, lip/tongue swelling). No involvement of head / face. No known thyroid disease.     Review of Systems   Constitutional, HEENT, pulmonary, GI, , neuro, skin, endocrine systems are negative, except as otherwise noted.      Objective    /80 (BP Location: Right arm, Patient Position: Sitting, Cuff Size: Adult Regular)   Pulse 79   Temp 97.1  F (36.2  C) (Tympanic)   Resp 14   Ht 1.702 m (5' 7\")   Wt 91.3 kg (201 lb 3.2 oz)   SpO2 100%   BMI 31.51 kg/m    Body mass index is 31.51 kg/m .  Physical Exam   GENERAL: healthy, alert and no distress  HENT: ear canals and TM's normal, nose and mouth without ulcers or lesions  NECK: no adenopathy, no asymmetry, masses, or scars and thyroid normal to palpation  RESP: lungs clear to auscultation - no rales, rhonchi or wheezes  CV: regular rate and rhythm, normal S1 S2, no S3 or S4, no murmur, click or rub, no peripheral edema and peripheral pulses strong  ABDOMEN: soft, nontender, no hepatosplenomegaly, no masses and bowel sounds normal  SKIN: No rash or urticarial lesions. Marked excoriative stigmata noted over legs, arms, and trunk.   NEURO: Normal strength and tone, mentation intact and speech normal  PSYCH: mentation appears normal, affect normal/bright                "

## 2021-02-19 LAB
ALBUMIN SERPL-MCNC: 3.9 G/DL (ref 3.4–5)
ALP SERPL-CCNC: 79 U/L (ref 40–150)
ALT SERPL W P-5'-P-CCNC: 17 U/L (ref 0–50)
ANION GAP SERPL CALCULATED.3IONS-SCNC: 6 MMOL/L (ref 3–14)
AST SERPL W P-5'-P-CCNC: 12 U/L (ref 0–45)
BILIRUB SERPL-MCNC: 0.2 MG/DL (ref 0.2–1.3)
BUN SERPL-MCNC: 17 MG/DL (ref 7–30)
CALCIUM SERPL-MCNC: 9.3 MG/DL (ref 8.5–10.1)
CHLORIDE SERPL-SCNC: 108 MMOL/L (ref 94–109)
CO2 SERPL-SCNC: 22 MMOL/L (ref 20–32)
CREAT SERPL-MCNC: 0.93 MG/DL (ref 0.52–1.04)
CRP SERPL-MCNC: <2.9 MG/L (ref 0–8)
FERRITIN SERPL-MCNC: 11 NG/ML (ref 12–150)
GFR SERPL CREATININE-BSD FRML MDRD: 74 ML/MIN/{1.73_M2}
GLUCOSE SERPL-MCNC: 86 MG/DL (ref 70–99)
IRON SATN MFR SERPL: 7 % (ref 15–46)
IRON SERPL-MCNC: 34 UG/DL (ref 35–180)
POTASSIUM SERPL-SCNC: 4.7 MMOL/L (ref 3.4–5.3)
PROT SERPL-MCNC: 7.6 G/DL (ref 6.8–8.8)
SODIUM SERPL-SCNC: 136 MMOL/L (ref 133–144)
TIBC SERPL-MCNC: 497 UG/DL (ref 240–430)
TSH SERPL DL<=0.005 MIU/L-ACNC: 1.37 MU/L (ref 0.4–4)

## 2021-02-19 RX ORDER — FERROUS SULFATE 325(65) MG
325 TABLET, DELAYED RELEASE (ENTERIC COATED) ORAL EVERY OTHER DAY
Qty: 45 TABLET | Refills: 3 | Status: SHIPPED | OUTPATIENT
Start: 2021-02-19

## 2021-02-23 ENCOUNTER — MYC MEDICAL ADVICE (OUTPATIENT)
Dept: FAMILY MEDICINE | Facility: CLINIC | Age: 46
End: 2021-02-23

## 2021-02-24 DIAGNOSIS — Z11.59 ENCOUNTER FOR SCREENING FOR OTHER VIRAL DISEASES: ICD-10-CM

## 2021-03-07 DIAGNOSIS — Z11.59 ENCOUNTER FOR SCREENING FOR OTHER VIRAL DISEASES: ICD-10-CM

## 2021-03-07 LAB
LABORATORY COMMENT REPORT: NORMAL
SARS-COV-2 RNA RESP QL NAA+PROBE: NEGATIVE
SARS-COV-2 RNA RESP QL NAA+PROBE: NORMAL
SPECIMEN SOURCE: NORMAL
SPECIMEN SOURCE: NORMAL

## 2021-03-07 PROCEDURE — 87635 SARS-COV-2 COVID-19 AMP PRB: CPT | Performed by: COLON & RECTAL SURGERY

## 2021-03-10 ENCOUNTER — HOSPITAL ENCOUNTER (OUTPATIENT)
Facility: CLINIC | Age: 46
Discharge: HOME | End: 2021-03-10
Attending: COLON & RECTAL SURGERY | Admitting: COLON & RECTAL SURGERY
Payer: COMMERCIAL

## 2021-03-10 VITALS
HEIGHT: 67 IN | OXYGEN SATURATION: 100 % | RESPIRATION RATE: 21 BRPM | WEIGHT: 201 LBS | DIASTOLIC BLOOD PRESSURE: 67 MMHG | SYSTOLIC BLOOD PRESSURE: 118 MMHG | HEART RATE: 95 BPM | TEMPERATURE: 97.2 F | BODY MASS INDEX: 31.55 KG/M2

## 2021-03-10 LAB — COLONOSCOPY: NORMAL

## 2021-03-10 PROCEDURE — 88305 TISSUE EXAM BY PATHOLOGIST: CPT | Mod: TC | Performed by: COLON & RECTAL SURGERY

## 2021-03-10 PROCEDURE — 45380 COLONOSCOPY AND BIOPSY: CPT | Performed by: COLON & RECTAL SURGERY

## 2021-03-10 PROCEDURE — 88305 TISSUE EXAM BY PATHOLOGIST: CPT | Mod: 26 | Performed by: PATHOLOGY

## 2021-03-10 PROCEDURE — 250N000011 HC RX IP 250 OP 636: Performed by: COLON & RECTAL SURGERY

## 2021-03-10 PROCEDURE — G0500 MOD SEDAT ENDO SERVICE >5YRS: HCPCS | Performed by: COLON & RECTAL SURGERY

## 2021-03-10 RX ORDER — ONDANSETRON 2 MG/ML
4 INJECTION INTRAMUSCULAR; INTRAVENOUS
Status: DISCONTINUED | OUTPATIENT
Start: 2021-03-10 | End: 2021-03-10 | Stop reason: HOSPADM

## 2021-03-10 RX ORDER — FENTANYL CITRATE 50 UG/ML
INJECTION, SOLUTION INTRAMUSCULAR; INTRAVENOUS PRN
Status: DISCONTINUED | OUTPATIENT
Start: 2021-03-10 | End: 2021-03-10 | Stop reason: HOSPADM

## 2021-03-10 RX ORDER — LIDOCAINE 40 MG/G
CREAM TOPICAL
Status: DISCONTINUED | OUTPATIENT
Start: 2021-03-10 | End: 2021-03-10 | Stop reason: HOSPADM

## 2021-03-10 ASSESSMENT — MIFFLIN-ST. JEOR: SCORE: 1594.36

## 2021-03-10 NOTE — H&P
Colon & Rectal Surgery History and Physical  Pre-Endoscopy Procedure Note    History of Present Illness   I have been asked by Mariano Ayala to evaluate this 46 year old female for colorectal cancer screening. She currently denies any abdominal pain, weight loss, or bleeding per rectum. She has an unexplained anemia and has had intermittent diarrhea for the past few months.    Past Medical History  Diagnosis Date     Contraceptive management      Hives      Migraine headaches      Mild major depression      Tubal ligation status     Essure       Past Surgical History  Procedure Laterality Date     BIOPSY BREAST SEED LOCALIZATION Left 2018    Procedure: SEED LOCALIZED LEFT BREAST MASS EXCISION;  Surgeon: Tam Lester MD;  Location:  [facility]      DELIVERY ONLY       HYSTEROS W PERMANENT FALLOPAIN IMPLANT  4-3-2012    Essure     LAPAROSCOPIC CHOLECYSTECTOMY N/A 2020    Procedure: LAPAROSCOPIC CHOLECYSTECTOMY;  Surgeon: Angel Benjamin MD;  Location: [facility]          Medications  Medication Sig     ferrous sulfate (FE TABS) 325 (65 Fe) MG EC tablet Take 1 tablet (325 mg) by mouth every other day     topiramate (TOPAMAX) 25 MG capsule Take 4 capsules by mouth At Bedtime.     venlafaxine (EFFEXOR-XR) 75 MG 24 hr capsule Take 1 capsule (75 mg) by mouth daily     ketorolac (TORADOL) 30 MG/ML injection Inject 2 mLs into the muscle as needed.     predniSONE (DELTASONE) 20 MG tablet Take 3 tabs by mouth daily x 2 days, then 2 tabs daily x 2 days, then 1 tab daily x 2 days, then 1/2 tab daily x 2 days.       Allergies  Allergen Reactions     NKDA [No Known Drug Allergies]         Family History   Family history includes Alcohol/Drug Use in her paternal grandfather; Breast Cancer in her maternal grandmother and paternal grandmother; Cancer (age of onset: 55) in her father; Lipid Disorder in her father, paternal grandmother, and sister; Myocardial Infarction (age of onset: 53) in  "her father; Respiratory Disorder in her paternal grandmother; Skin Cancer in her maternal grandmother; Thyroid Disease in her paternal grandmother.     Social History   She reports that she has never smoked. She has never used smokeless tobacco. She reports current alcohol use. She reports that she does not use drugs.    Review of Systems   Constitutional:  No fever, weight change or fatigue.    Eyes:     No dry eyes or vision changes.   Ears/Nose/Throat/Neck:  No oral ulcers, sore throat or voice change.    Cardiovascular:   No palpitations, syncope, angina or edema.   Respiratory:    No chest pain, excessive sleepiness, shortness of breath or hemoptysis.    Gastrointestinal:   No abdominal pain, nausea, vomiting, diarrhea or heartburn.    Genitourinary:   No dysuria, hematuria, urinary retention or urinary frequency.   Musculoskeletal:  No joint swelling or arthralgias.    Dermatologic:  No skin rash or other skin changes.   Neurologic:    No focal weakness or numbness. No neuropathy.   Psychiatric:    No depression, anxiety, suicidal ideation, or paranoid ideation.   Endocrine:   No cold or heat intolerance, polydipsia, hirsutism, change in libido, or flushing.   Hematology/Lymphatic:  No bleeding or lymphadenopathy.    Allergy/Immunology:  No rhinitis or hives.     Physical Exam   Vitals:  /72 RR 12 HR 68 Temperature 97.2  F (36.2  C), height 1.702 m (5' 7\"), weight 91.2 kg (201 lb), not currently breastfeeding.    General:  Alert and oriented to person, place and time   Airway: Normal oropharyngeal airway and neck mobility   Lungs:  Clear bilaterally   Heart:  Regular rate and rhythm   Abdomen: Soft, NT, ND, no masses   Extremities: Warm, good capillary refill    ASA Grade: II (mild systemic disease)    Impression: Cleared for use of conscious sedation for evaluation of diarrhea and anemia    Plan: Proceed with colonoscopy     Chichi Pradhan MD  [facility]  [phone]

## 2021-03-11 LAB — COPATH REPORT: NORMAL

## 2021-03-31 ENCOUNTER — IMMUNIZATION (OUTPATIENT)
Dept: PEDIATRICS | Facility: CLINIC | Age: 46
End: 2021-03-31
Payer: COMMERCIAL

## 2021-03-31 PROCEDURE — 0001A PR COVID VAC PFIZER DIL RECON 30 MCG/0.3 ML IM: CPT

## 2021-03-31 PROCEDURE — 91300 PR COVID VAC PFIZER DIL RECON 30 MCG/0.3 ML IM: CPT

## 2021-04-03 ENCOUNTER — HEALTH MAINTENANCE LETTER (OUTPATIENT)
Age: 46
End: 2021-04-03

## 2021-04-21 ENCOUNTER — IMMUNIZATION (OUTPATIENT)
Dept: PEDIATRICS | Facility: CLINIC | Age: 46
End: 2021-04-21
Attending: INTERNAL MEDICINE
Payer: COMMERCIAL

## 2021-04-21 PROCEDURE — 91300 PR COVID VAC PFIZER DIL RECON 30 MCG/0.3 ML IM: CPT

## 2021-04-21 PROCEDURE — 0002A PR COVID VAC PFIZER DIL RECON 30 MCG/0.3 ML IM: CPT

## 2021-07-21 DIAGNOSIS — Z12.31 VISIT FOR SCREENING MAMMOGRAM: ICD-10-CM

## 2021-07-21 PROCEDURE — 77067 SCR MAMMO BI INCL CAD: CPT | Mod: TC | Performed by: RADIOLOGY

## 2021-07-21 PROCEDURE — 77063 BREAST TOMOSYNTHESIS BI: CPT | Mod: TC | Performed by: RADIOLOGY

## 2021-09-12 ENCOUNTER — HEALTH MAINTENANCE LETTER (OUTPATIENT)
Age: 46
End: 2021-09-12

## 2022-04-24 ENCOUNTER — HEALTH MAINTENANCE LETTER (OUTPATIENT)
Age: 47
End: 2022-04-24

## 2022-04-26 NOTE — LETTER
My Depression Action Plan  Name: Nicole Lee Meester   Date of Birth 1975  Date: 11/1/2018    My doctor: [facility], [facility]   My clinic: [facility]  [address]  [place] [address]  [phone]          GREEN    ZONE   Good Control    What it looks like:     Things are going generally well. You have normal up s and down s. You may even feel depressed from time to time, but bad moods usually last less than a day.   What you need to do:  1. Continue to care for yourself (see self care plan)  2. Check your depression survival kit and update it as needed  3. Follow your physician s recommendations including any medication.  4. Do not stop taking medication unless you consult with your physician first.           YELLOW         ZONE Getting Worse    What it looks like:     Depression is starting to interfere with your life.     It may be hard to get out of bed; you may be starting to isolate yourself from others.    Symptoms of depression are starting to last most all day and this has happened for several days.     You may have suicidal thoughts but they are not constant.   What you need to do:     1. Call your care team, your response to treatment will improve if you keep your care team informed of your progress. Yellow periods are signs an adjustment may need to be made.     2. Continue your self-care, even if you have to fake it!    3. Talk to someone in your support network    4. Open up your depression survival kit           RED    ZONE Medical Alert - Get Help    What it looks like:     Depression is seriously interfering with your life.     You may experience these or other symptoms: You can t get out of bed most days, can t work or engage in other necessary activities, you have trouble taking care of basic hygiene, or basic responsibilities, thoughts of suicide or death that will not go away, self-injurious behavior.     What you need to  do:  1. Call your care team and request a same-day appointment. If they are not available (weekends or after hours) call your local crisis line, emergency room or 911.            Depression Self Care Plan / Survival Kit    Self-Care for Depression  Here s the deal. Your body and mind are really not as separate as most people think.  What you do and think affects how you feel and how you feel influences what you do and think. This means if you do things that people who feel good do, it will help you feel better.  Sometimes this is all it takes.  There is also a place for medication and therapy depending on how severe your depression is, so be sure to consult with your medical provider and/ or Behavioral Health Consultant if your symptoms are worsening or not improving.     In order to better manage my stress, I will:    Exercise  Get some form of exercise, every day. This will help reduce pain and release endorphins, the  feel good  chemicals in your brain. This is almost as good as taking antidepressants!  This is not the same as joining a gym and then never going! (they count on that by the way ) It can be as simple as just going for a walk or doing some gardening, anything that will get you moving.      Hygiene   Maintain good hygiene (Get out of bed in the morning, Make your bed, Brush your teeth, Take a shower, and Get dressed like you were going to work, even if you are unemployed).  If your clothes don't fit try to get ones that do.    Diet  I will strive to eat foods that are good for me, drink plenty of water, and avoid excessive sugar, caffeine, alcohol, and other mood-altering substances.  Some foods that are helpful in depression are: complex carbohydrates, B vitamins, flaxseed, fish or fish oil, fresh fruits and vegetables.    Psychotherapy  I agree to participate in Individual Therapy (if recommended).    Medication  If prescribed medications, I agree to take them.  Missing doses can result in serious  side effects.  I understand that drinking alcohol, or other illicit drug use, may cause potential side effects.  I will not stop my medication abruptly without first discussing it with my provider.    Staying Connected With Others  I will stay in touch with my friends, family members, and my primary care provider/team.    Use your imagination  Be creative.  We all have a creative side; it doesn t matter if it s oil painting, sand castles, or mud pies! This will also kick up the endorphins.    Witness Beauty  (AKA stop and smell the roses) Take a look outside, even in mid-winter. Notice colors, textures. Watch the squirrels and birds.     Service to others  Be of service to others.  There is always someone else in need.  By helping others we can  get out of ourselves  and remember the really important things.  This also provides opportunities for practicing all the other parts of the program.    Humor  Laugh and be silly!  Adjust your TV habits for less news and crime-drama and more comedy.    Control your stress  Try breathing deep, massage therapy, biofeedback, and meditation. Find time to relax each day.     My support system    Clinic Contact:  Phone number:    Contact 1:  Phone number:    Contact 2:  Phone number:    Protestant/:  Phone number:    Therapist:  Phone number:    Local crisis center:    Phone number:    Other community support:  Phone number:       [phone]

## 2022-09-20 ENCOUNTER — ANCILLARY PROCEDURE (OUTPATIENT)
Dept: MAMMOGRAPHY | Facility: CLINIC | Age: 47
End: 2022-09-20
Attending: NURSE PRACTITIONER
Payer: COMMERCIAL

## 2022-09-20 DIAGNOSIS — Z12.31 VISIT FOR SCREENING MAMMOGRAM: ICD-10-CM

## 2022-09-20 PROCEDURE — 77067 SCR MAMMO BI INCL CAD: CPT | Mod: TC | Performed by: RADIOLOGY

## 2022-09-20 PROCEDURE — 77063 BREAST TOMOSYNTHESIS BI: CPT | Mod: TC | Performed by: RADIOLOGY

## 2022-11-19 ENCOUNTER — HEALTH MAINTENANCE LETTER (OUTPATIENT)
Age: 47
End: 2022-11-19

## 2023-06-01 ENCOUNTER — HEALTH MAINTENANCE LETTER (OUTPATIENT)
Age: 48
End: 2023-06-01

## 2023-06-10 NOTE — ANESTHESIA CARE TRANSFER NOTE
Patient: Nicole Lee Meester    Procedure(s):  LAPAROSCOPIC CHOLECYSTECTOMY    Diagnosis: Cholelithiasis [K80.20]  Diagnosis Additional Information: No value filed.    Anesthesia Type:   General, ETT     Note:  Airway :Face Mask  Patient transferred to:PACU  Comments: Pt exhibits spont resps, all monitors and alarms on in pacu, report given to RN, dannys.Handoff Report: Identifed the Patient, Identified the Reponsible Provider, Reviewed the pertinent medical history, Discussed the surgical course, Reviewed Intra-OP anesthesia mangement and issues during anesthesia, Set expectations for post-procedure period and Allowed opportunity for questions and acknowledgement of understanding      Vitals: (Last set prior to Anesthesia Care Transfer)    CRNA VITALS  1/30/2020 1519 - 1/30/2020 1549      1/30/2020             Resp Rate (set):  10                Electronically Signed By: CHRIST Vera CRNA  January 30, 2020  3:49 PM  
Patient received seated at edge of stretcher in NAD; agreeable to participate in OT evaluation. +cardiac monitor.

## 2023-10-17 ENCOUNTER — ANCILLARY PROCEDURE (OUTPATIENT)
Dept: MAMMOGRAPHY | Facility: CLINIC | Age: 48
End: 2023-10-17
Attending: NURSE PRACTITIONER
Payer: COMMERCIAL

## 2023-10-17 DIAGNOSIS — Z12.31 VISIT FOR SCREENING MAMMOGRAM: ICD-10-CM

## 2023-10-17 PROCEDURE — 77063 BREAST TOMOSYNTHESIS BI: CPT | Mod: TC | Performed by: RADIOLOGY

## 2023-10-17 PROCEDURE — 77067 SCR MAMMO BI INCL CAD: CPT | Mod: TC | Performed by: RADIOLOGY

## 2024-06-15 ENCOUNTER — HEALTH MAINTENANCE LETTER (OUTPATIENT)
Age: 49
End: 2024-06-15

## 2024-10-22 ENCOUNTER — ANCILLARY PROCEDURE (OUTPATIENT)
Dept: MAMMOGRAPHY | Facility: CLINIC | Age: 49
End: 2024-10-22
Payer: COMMERCIAL

## 2024-10-22 DIAGNOSIS — Z12.31 VISIT FOR SCREENING MAMMOGRAM: ICD-10-CM

## 2024-10-22 PROCEDURE — 77063 BREAST TOMOSYNTHESIS BI: CPT | Mod: TC | Performed by: RADIOLOGY

## 2024-10-22 PROCEDURE — 77067 SCR MAMMO BI INCL CAD: CPT | Mod: TC | Performed by: RADIOLOGY

## 2024-12-19 NOTE — PROGRESS NOTES
----- Message from Guzman Mix MD sent at 12/18/2024  5:17 PM CST -----  Please call patient and let them know that all their tests results are stable.   Surgery Postoperative Note    Doing well. Some pain but improving. No other issues.    Breast- incision well healed. No ecchymosis. No seroma or hematoma.     A/P  Nicole Lee Meester is recovering well from a Lumpectomy. Final path shows all benign pathology. She can go back to routine screening. I advised her to slowly return to regular activity. I expect she will make a complete recovery without issues.    Thank you for the opportunity to help in her care.    Tam Lester M.D.  [facility]  [phone]    Please route or send letter to:  Primary Care Provider (PCP) and Referring Provider

## 2025-06-21 ENCOUNTER — HEALTH MAINTENANCE LETTER (OUTPATIENT)
Age: 50
End: 2025-06-21

## (undated) DEVICE — SUCTION CANISTER MEDIVAC LINER 3000ML W/LID 65651-530

## (undated) DEVICE — ENDO TROCAR FIRST ENTRY KII FIOS Z-THRD 05X100MM CTF03

## (undated) DEVICE — PREP CHLORAPREP 26ML TINTED ORANGE  260815

## (undated) DEVICE — PAD CHUX UNDERPAD 23X24" 7136

## (undated) DEVICE — SLEEVE PROTECTIVE BREAST BIOPSY  GMSLV001-10

## (undated) DEVICE — ESU HOLDER LAP INST DISP PURPLE LONG 330MM H-PRO-330

## (undated) DEVICE — SOL WATER IRRIG 1000ML BOTTLE 2F7114

## (undated) DEVICE — PACK LAP CHOLE SLC15LCFSD

## (undated) DEVICE — ENDO TROCAR FIRST ENTRY KII FIOS Z-THRD 11X100MM CTF33

## (undated) DEVICE — LINEN TOWEL PACK X5 5464

## (undated) DEVICE — NDL 25GA 1.5" 305127

## (undated) DEVICE — SU MONOCRYL 4-0 PS-2 18" UND Y496G

## (undated) DEVICE — SU SILK 2-0 FSL 18" 677G

## (undated) DEVICE — SUCTION IRR STRYKERFLOW II W/TIP 250-070-520

## (undated) DEVICE — SU VICRYL 0 UR-6 27" J603H

## (undated) DEVICE — DRAPE BREAST/CHEST 29420

## (undated) DEVICE — SU VICRYL 4-0 PS-2 18" UND J496H

## (undated) DEVICE — DEVICE SUTURE GRASPER TROCAR CLOSURE 14GA PMITCSG

## (undated) DEVICE — ENDO SCOPE WARMER LF TM500

## (undated) DEVICE — CLIP APPLIER ENDO ROTATING 10MM MED/LG ER320

## (undated) DEVICE — GLOVE PROTEXIS W/NEU-THERA 7.5  2D73TE75

## (undated) DEVICE — SU VICRYL 3-0 TIE 12X18" J904T

## (undated) DEVICE — ESU ELEC BLADE 2.75" COATED/INSULATED E1455

## (undated) DEVICE — GLOVE GAMMEX DERMAPRENE ULTRA SZ 8 LF 8516

## (undated) DEVICE — PACK MINOR SBA15MIFSE

## (undated) DEVICE — ENDO TROCAR SLEEVE KII Z-THREADED 05X100MM CTS02

## (undated) DEVICE — SYR BULB IRRIG DOVER 60 ML LATEX FREE 67000

## (undated) DEVICE — SU VICRYL 3-0 SH 27" J316H

## (undated) DEVICE — EVAC SYSTEM CLEAR FLOW SC082500

## (undated) DEVICE — SOL NACL 0.9% INJ 1000ML BAG 2B1324X

## (undated) DEVICE — GLOVE GAMMEX DERMAPRENE ULTRA SZ 8.5 LF 8517

## (undated) RX ORDER — FENTANYL CITRATE 50 UG/ML
INJECTION, SOLUTION INTRAMUSCULAR; INTRAVENOUS
Status: DISPENSED
Start: 2020-01-30

## (undated) RX ORDER — OXYCODONE HYDROCHLORIDE 5 MG/1
TABLET ORAL
Status: DISPENSED
Start: 2020-01-30

## (undated) RX ORDER — PROPOFOL 10 MG/ML
INJECTION, EMULSION INTRAVENOUS
Status: DISPENSED
Start: 2020-01-30

## (undated) RX ORDER — KETOROLAC TROMETHAMINE 30 MG/ML
INJECTION, SOLUTION INTRAMUSCULAR; INTRAVENOUS
Status: DISPENSED
Start: 2020-01-30

## (undated) RX ORDER — CEFAZOLIN SODIUM 2 G/100ML
INJECTION, SOLUTION INTRAVENOUS
Status: DISPENSED
Start: 2018-12-05

## (undated) RX ORDER — FENTANYL CITRATE 50 UG/ML
INJECTION, SOLUTION INTRAMUSCULAR; INTRAVENOUS
Status: DISPENSED
Start: 2018-12-05

## (undated) RX ORDER — DEXAMETHASONE SODIUM PHOSPHATE 4 MG/ML
INJECTION, SOLUTION INTRA-ARTICULAR; INTRALESIONAL; INTRAMUSCULAR; INTRAVENOUS; SOFT TISSUE
Status: DISPENSED
Start: 2018-12-05

## (undated) RX ORDER — DEXAMETHASONE SODIUM PHOSPHATE 4 MG/ML
INJECTION, SOLUTION INTRA-ARTICULAR; INTRALESIONAL; INTRAMUSCULAR; INTRAVENOUS; SOFT TISSUE
Status: DISPENSED
Start: 2020-01-30

## (undated) RX ORDER — GLYCOPYRROLATE 0.2 MG/ML
INJECTION, SOLUTION INTRAMUSCULAR; INTRAVENOUS
Status: DISPENSED
Start: 2020-01-30

## (undated) RX ORDER — FENTANYL CITRATE 0.05 MG/ML
INJECTION, SOLUTION INTRAMUSCULAR; INTRAVENOUS
Status: DISPENSED
Start: 2020-01-30

## (undated) RX ORDER — ONDANSETRON 2 MG/ML
INJECTION INTRAMUSCULAR; INTRAVENOUS
Status: DISPENSED
Start: 2018-12-05

## (undated) RX ORDER — HYDROMORPHONE HYDROCHLORIDE 1 MG/ML
INJECTION, SOLUTION INTRAMUSCULAR; INTRAVENOUS; SUBCUTANEOUS
Status: DISPENSED
Start: 2020-01-30

## (undated) RX ORDER — LIDOCAINE HYDROCHLORIDE 20 MG/ML
INJECTION, SOLUTION EPIDURAL; INFILTRATION; INTRACAUDAL; PERINEURAL
Status: DISPENSED
Start: 2020-01-30

## (undated) RX ORDER — CEFAZOLIN SODIUM 2 G/100ML
INJECTION, SOLUTION INTRAVENOUS
Status: DISPENSED
Start: 2020-01-30

## (undated) RX ORDER — FENTANYL CITRATE 50 UG/ML
INJECTION, SOLUTION INTRAMUSCULAR; INTRAVENOUS
Status: DISPENSED
Start: 2021-03-10

## (undated) RX ORDER — PROPOFOL 10 MG/ML
INJECTION, EMULSION INTRAVENOUS
Status: DISPENSED
Start: 2018-12-05

## (undated) RX ORDER — ONDANSETRON 2 MG/ML
INJECTION INTRAMUSCULAR; INTRAVENOUS
Status: DISPENSED
Start: 2020-01-30

## (undated) RX ORDER — HYDROCODONE BITARTRATE AND ACETAMINOPHEN 5; 325 MG/1; MG/1
TABLET ORAL
Status: DISPENSED
Start: 2018-12-05

## (undated) RX ORDER — LIDOCAINE HYDROCHLORIDE 20 MG/ML
INJECTION, SOLUTION EPIDURAL; INFILTRATION; INTRACAUDAL; PERINEURAL
Status: DISPENSED
Start: 2018-12-05